# Patient Record
Sex: MALE | Race: WHITE
[De-identification: names, ages, dates, MRNs, and addresses within clinical notes are randomized per-mention and may not be internally consistent; named-entity substitution may affect disease eponyms.]

---

## 2019-07-24 ENCOUNTER — HOSPITAL ENCOUNTER (OUTPATIENT)
Dept: HOSPITAL 89 - AMB | Age: 59
End: 2019-07-24
Payer: COMMERCIAL

## 2019-07-24 ENCOUNTER — HOSPITAL ENCOUNTER (INPATIENT)
Dept: HOSPITAL 89 - ER | Age: 59
LOS: 6 days | Discharge: NURSING, REHAB OR LONG-TERM CARE | DRG: 66 | End: 2019-07-30
Attending: INTERNAL MEDICINE | Admitting: INTERNAL MEDICINE
Payer: COMMERCIAL

## 2019-07-24 ENCOUNTER — TRANSFERRED RECORDS (OUTPATIENT)
Dept: HEALTH INFORMATION MANAGEMENT | Facility: CLINIC | Age: 59
End: 2019-07-24

## 2019-07-24 VITALS — DIASTOLIC BLOOD PRESSURE: 82 MMHG | SYSTOLIC BLOOD PRESSURE: 135 MMHG

## 2019-07-24 VITALS — DIASTOLIC BLOOD PRESSURE: 88 MMHG | SYSTOLIC BLOOD PRESSURE: 120 MMHG

## 2019-07-24 VITALS — SYSTOLIC BLOOD PRESSURE: 115 MMHG | DIASTOLIC BLOOD PRESSURE: 81 MMHG

## 2019-07-24 VITALS — DIASTOLIC BLOOD PRESSURE: 84 MMHG | SYSTOLIC BLOOD PRESSURE: 139 MMHG

## 2019-07-24 VITALS — DIASTOLIC BLOOD PRESSURE: 82 MMHG | SYSTOLIC BLOOD PRESSURE: 129 MMHG

## 2019-07-24 VITALS — BODY MASS INDEX: 32.78 KG/M2 | HEIGHT: 70 IN | WEIGHT: 229 LBS

## 2019-07-24 VITALS — SYSTOLIC BLOOD PRESSURE: 156 MMHG | DIASTOLIC BLOOD PRESSURE: 82 MMHG

## 2019-07-24 DIAGNOSIS — E78.5: ICD-10-CM
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
## 2019-07-24 DIAGNOSIS — Z88.8: ICD-10-CM
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
## 2019-07-24 DIAGNOSIS — I10: ICD-10-CM

## 2019-07-24 DIAGNOSIS — I63.411: Primary | ICD-10-CM

## 2019-07-24 DIAGNOSIS — R29.705: ICD-10-CM

## 2019-07-24 DIAGNOSIS — R53.1: Primary | ICD-10-CM
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
## 2019-07-24 LAB
ALT SERPL-CCNC: 36 U/L (ref 0–56)
AST SERPL-CCNC: 30 U/L (ref 0–35)
CREAT SERPL-MCNC: 1 MG/DL (ref 0.66–1.25)
GFR SERPL CREATININE-BSD FRML MDRD: >60 ML/MIN/1.73M2
GLUCOSE SERPL-MCNC: 115 MG/DL (ref 75–110)
INR PPP: 0.95
INR PPP: 0.95
PLATELET COUNT, AUTOMATED: 279 K/UL (ref 150–450)
POTASSIUM SERPL-SCNC: 3.6 MMOL/L (ref 3.5–5)

## 2019-07-24 PROCEDURE — 70551 MRI BRAIN STEM W/O DYE: CPT

## 2019-07-24 PROCEDURE — 99291 CRITICAL CARE FIRST HOUR: CPT

## 2019-07-24 PROCEDURE — 97166 OT EVAL MOD COMPLEX 45 MIN: CPT

## 2019-07-24 PROCEDURE — 84484 ASSAY OF TROPONIN QUANT: CPT

## 2019-07-24 PROCEDURE — 84075 ASSAY ALKALINE PHOSPHATASE: CPT

## 2019-07-24 PROCEDURE — 93306 TTE W/DOPPLER COMPLETE: CPT

## 2019-07-24 PROCEDURE — 92523 SPEECH SOUND LANG COMPREHEN: CPT

## 2019-07-24 PROCEDURE — 82247 BILIRUBIN TOTAL: CPT

## 2019-07-24 PROCEDURE — 97162 PT EVAL MOD COMPLEX 30 MIN: CPT

## 2019-07-24 PROCEDURE — 82947 ASSAY GLUCOSE BLOOD QUANT: CPT

## 2019-07-24 PROCEDURE — 84155 ASSAY OF PROTEIN SERUM: CPT

## 2019-07-24 PROCEDURE — 71045 X-RAY EXAM CHEST 1 VIEW: CPT

## 2019-07-24 PROCEDURE — 36415 COLL VENOUS BLD VENIPUNCTURE: CPT

## 2019-07-24 PROCEDURE — 82040 ASSAY OF SERUM ALBUMIN: CPT

## 2019-07-24 PROCEDURE — 82435 ASSAY OF BLOOD CHLORIDE: CPT

## 2019-07-24 PROCEDURE — 84520 ASSAY OF UREA NITROGEN: CPT

## 2019-07-24 PROCEDURE — 85025 COMPLETE CBC W/AUTO DIFF WBC: CPT

## 2019-07-24 PROCEDURE — 84478 ASSAY OF TRIGLYCERIDES: CPT

## 2019-07-24 PROCEDURE — 99285 EMERGENCY DEPT VISIT HI MDM: CPT

## 2019-07-24 PROCEDURE — 84295 ASSAY OF SERUM SODIUM: CPT

## 2019-07-24 PROCEDURE — 70498 CT ANGIOGRAPHY NECK: CPT

## 2019-07-24 PROCEDURE — 82565 ASSAY OF CREATININE: CPT

## 2019-07-24 PROCEDURE — 70450 CT HEAD/BRAIN W/O DYE: CPT

## 2019-07-24 PROCEDURE — 82310 ASSAY OF CALCIUM: CPT

## 2019-07-24 PROCEDURE — 84460 ALANINE AMINO (ALT) (SGPT): CPT

## 2019-07-24 PROCEDURE — 99292 CRITICAL CARE ADDL 30 MIN: CPT

## 2019-07-24 PROCEDURE — 85730 THROMBOPLASTIN TIME PARTIAL: CPT

## 2019-07-24 PROCEDURE — 84450 TRANSFERASE (AST) (SGOT): CPT

## 2019-07-24 PROCEDURE — 70496 CT ANGIOGRAPHY HEAD: CPT

## 2019-07-24 PROCEDURE — 83718 ASSAY OF LIPOPROTEIN: CPT

## 2019-07-24 PROCEDURE — 93005 ELECTROCARDIOGRAM TRACING: CPT

## 2019-07-24 PROCEDURE — 84132 ASSAY OF SERUM POTASSIUM: CPT

## 2019-07-24 PROCEDURE — 82374 ASSAY BLOOD CARBON DIOXIDE: CPT

## 2019-07-24 PROCEDURE — 85610 PROTHROMBIN TIME: CPT

## 2019-07-24 PROCEDURE — 82465 ASSAY BLD/SERUM CHOLESTEROL: CPT

## 2019-07-24 RX ADMIN — ENOXAPARIN SODIUM SCH MG: 100 INJECTION SUBCUTANEOUS at 10:17

## 2019-07-24 RX ADMIN — SIMVASTATIN SCH MG: 20 TABLET, FILM COATED ORAL at 21:22

## 2019-07-24 NOTE — RADIOLOGY IMAGING REPORT
FACILITY: [facility] 

 

PATIENT NAME: Jan Croft

: 1960

MR: [id number]

V: [id number]

EXAM DATE: 

ORDERING PHYSICIAN: DWIGHT HEBERT

TECHNOLOGIST: 

 

Location: [facility]

Patient: Jan Croft

: 1960

MRN: [id number]

Visit/Account:[id number]

Date of Sevice:  2019

 

ACCESSION #: [id number]

 

CT angiogram head and neck

 

INDICATION: Stroke alert.

 

COMPARISON:  Same-day head CT.

 

TECHNIQUE:  Axial CT images were obtained through the cerebral and neck vasculature.  Multiplanar ref
ormatted images were provided.  2-D and 3-D imaging was performed.  Evaluation of internal carotid st
enosis was performed per NASCET criteria.  A total of 75 mL Isovue-370 IV contrast was administered. 
  One of the following dose optimization techniques was utilized in the performance of this exam: Aut
omated exposure control; adjustment of the mA and/or kV according to the patient's size; or use of an
 iterative  reconstruction technique.  Specific details can be referenced in the facility's radiology
 CT exam operational policy.

 

 

FINDINGS:

The middle cerebral arteries, anterior cerebral arteries, posterior cerebral arteries as well as the 
bilateral vertebral arteries and carotid arteries are patent without focal stricture or aneurysm.  Mi
ld atherosclerosis at the right carotid bulb and at the bilateral internal carotid arteries with no s
ignificant stenosis.  The visualized aortic arch and great vessels off the aortic arch are unremarkab
le.

 

Dural venous sinuses are patent.

 

No acute osseous abnormality.  Moderate spondylosis at C5-6 with associated spinal canal and neural f
oraminal narrowing.  Soft tissues are nonacute.  1 cm slightly hyperattenuating nodule in the isthmus
 of the thyroid.  Lung apices are clear.  Azygos lobe.  Moderate coronary artery calcifications.

 

IMPRESSION:

1.  Mild atherosclerosis with no acute abnormality of the neck and cerebral vasculature.

 

2.  No apparent acute intracranial abnormality.

 

3.  Moderate coronary artery calcifications.

 

4.  1 cm thyroid nodule.

 

Dr. Hillman discussed this case with DWIGHT HEBERT on 2019 5:14 AM.

 

----------

MANAGING INCIDENTAL THYROID NODULES DETECTED ON CT OR MRI

 

*These do not apply to all patients, such as those with clinical risk factors for thyroid cancer or p
ediatric patients.

 

Suspicious CT or MRI findings (abnormal LNs or invasion of adjacent tissues)

Evaluate with thyroid US

 

NO Suspicious CT or MRI findings

Limited life expectancy or comorbidities

*  No further evaluation

 

General population

< 35 years old

*  < 1 cm ? No further evaluation

*  >/= 1 cm ? Evaluate with thyroid US

 

> 35 years old

*  < 1.5 cm ? No further evaluation

*  >/= 1.5 cm ? Evaluate with thyroid US

 

 

Teresa Chaparro et al.  Managing Incidental Thyroid Nodules Detected on Imaging: White Paper of the FANNIE KEYES Incidental Thyroid Findings Committee.  Journal of the American College of Radiology 2017.

 

uchnitn

 

Report Dictated By: Jona Hillman MD at 2019 5:02 AM

 

Report E-Signed By: Jona Hillman MD  at 2019 5:14 AM

 

WSN:[id number]

## 2019-07-24 NOTE — NUR
ST IMPRESSION

Cognitive linguistic and bedside swallow assessments complete. See

full report for detailed information. Mild cognitive deficits observed, 

particularly in the areas of attention and executive functions (consistent

with R frontal CVA). From a swallow standpoint, the pt is considered safe 

for continuance of a regular diet, thin liquids with adherence to general 

precautions. Pt may continue to experience difficulty with self-feeding as 

a result of L inattention and deficits in executive functioning skills (reduced 

self-monitoring, impulsiveness). Encourage staff to support as indicated. 

Recommend D/C to acute rehab.

## 2019-07-24 NOTE — HISTORY & PHYSICAL
History of Present Illness


Chief Complaint


L sided numbness, weakness


History of Present Illness


59M presented with L sided weakness and numbness. PMHx significant for HTN, HLD.


EMS called by patients wife after he fell out of bed around 4am and had 


difficulty standing up. Last known well 10pm 07.23 when they went to sleep in 


hotel. [place]. NIH 5 


in ER with Tele-stroke neurologist. At my time of evaluation NIH 4. MRI does 


show area of ischemia, no evidence of bleeding. Due to ASA allergy patient was 


started on clopidogrel.





History


Home Meds


Reported Medications


Simvastatin (SIMVASTATIN) 20 Mg Tablet, 20 MG PO HS, TAB


   7/24/19


Losartan Potassium (LOSARTAN POTASSIUM) 25 Mg Tablet, 25 MG PO QDAY


   7/24/19


Allergies:  


Coded Allergies:  


     aspirin (Verified  Allergy, Unknown, 7/24/19)


Patient History:  


FH: CAD (coronary artery disease)


Hx Smoking:  No





Review of Systems


All Systems Reviewed/Normal:  Yes, Except as Noted


Neurological:  Weakness, Other (L sided numbness)





Exam


Vital Signs





Vital Signs








  Date Time  Temp Pulse Resp B/P (MAP) Pulse Ox O2 Delivery O2 Flow Rate FiO2


 


7/24/19 07:30  73 18 121/75 (90)    


 


7/24/19 05:11     97   


 


7/24/19 03:37 99.1     Room Air  








General Appearance:  Alert, Awake, No Acute Distress, Afebrile


Neuro:  Other (L sided paresthesia, L arm weakness 3/5, L facial droop/paresthes


ia, LLE 5/5 strength, CN 2-12 otherwise intact)


Cardiovascular:  Normal Rhythm & Peripheral Pulses


Respiratory:  No Respiratory Distress


GI:  Abd Soft and Non-Tender


Extremities:  Soft and Non Tender, Warm, Pulses, Perfused


Integumentary:  Skin Intact without Lesion / Mass





Medical Decision Making


Data Points


Result Diagram:  


7/24/19 0329 7/24/19 0329








EKG / Imaging


EKG Interpretation


NSR


Monitor Interpretation:  Normal Sinus Rhythm





Assessment and Plan


Problems:  


(1) CVA (cerebrovascular accident)


Status:  Acute


Assessment & Plan:  NIH 5 in ER, NIH 4 on admission, MRI - Diffusion restriction


involving the perisylvian right frontal lobe. CTA with normal vacular imaging, 


no evidence of hemorrhage. Patient presented outside TPA window, begin Plavix 


due to patient allergy to ASA. Resume simvastatin. PT/OT/ST consulted. Monitor 


on telemetry. Echo with bubble study pending. 





(2) HTN (hypertension)


Status:  Chronic


Assessment & Plan:  Will hold losartan, allow permissive HTN. Only intervene if 


BP increases > 220 systolic over next 24-48 hours.








Venous Thromboembolism


Antithrombotics


Is Pt On Any Antithrombotics?:  Yes





CVA Documentation


Date of Onset of Symptoms:  Jul 24, 2019


Symptom Onset Unknown:  Yes


Signs and Symptoms of Stroke:  Dyspraxia of Arm


NIH Stroke Scale:  4


Date of NIH Scale:  Jul 24, 2019


Time of NIH Scale:  06:15


Baseline Date:  Jul 23, 2019


Baseline Time:  22:00


Heparin Therapy Protocol:  N/A





Exam


Sepsis Risk:  No Definite Risk





Problem Qualifiers





(1) CVA (cerebrovascular accident):  


CVA mechanism:  embolism  Precerebral and cerebral artery:  middle cerebral 


artery  Laterality of affected vessel:  right  Qualified Codes:  I63.411 - 


Cerebral infarction due to embolism of right middle cerebral artery








BRUCE CRAWFORD DO       Jul 24, 2019 08:25

## 2019-07-24 NOTE — NUR
Physical Therapy Impression



PT eval complete.  Noted grossly symmetrical strength 5/5 for B LE

dorsiflexion, knee flexion/extension, and hip flexion.  Noted L UE and LE

ataxia with movement.  Decreased sensation reported in L UE and LE with

light touch.  Pt able to perform bed mobility and sit<>stand stransfers

with SBA/CGA.  Attempted to ambulate with kimberly-walker in R UE,

Pt unable to safely use.  This PT provided CGA, then hand-hold assist,

then Min A for balance as Pt ambulated and became more fatigued.  Noted

moderate L neglect.  Strongly recommend acute rehab.





Physical Therapy Goals



1.  Independent bed mobility.

2.  Independent transfers.

3.  Mod I ambulation x 150' with least restrictive device.

4.  Ascend/descend 4 stairs SBA.



Patient's Goals

## 2019-07-24 NOTE — RADIOLOGY IMAGING REPORT
FACILITY: [facility] - [place], [place] 

 

PATIENT NAME: Jan Croft

: 1960

MR: [id number]

V: [id number]

EXAM DATE: 

ORDERING PHYSICIAN: DWIGHT HEBERT

TECHNOLOGIST: 

 

Location: [facility]

Patient: Jan Croft

: 1960

MRN: [id number]

Visit/Account:[id number]

Date of Sevice:  2019

 

ACCESSION #: [id number]

 

CT angiogram head and neck

 

INDICATION: Stroke alert.

 

COMPARISON:  Same-day head CT.

 

TECHNIQUE:  Axial CT images were obtained through the cerebral and neck vasculature.  Multiplanar ref
ormatted images were provided.  2-D and 3-D imaging was performed.  Evaluation of internal carotid st
enosis was performed per NASCET criteria.  A total of 75 mL Isovue-370 IV contrast was administered. 
  One of the following dose optimization techniques was utilized in the performance of this exam: Aut
omated exposure control; adjustment of the mA and/or kV according to the patient's size; or use of an
 iterative  reconstruction technique.  Specific details can be referenced in the facility's radiology
 CT exam operational policy.

 

 

FINDINGS:

The middle cerebral arteries, anterior cerebral arteries, posterior cerebral arteries as well as the 
bilateral vertebral arteries and carotid arteries are patent without focal stricture or aneurysm.  Mi
ld atherosclerosis at the right carotid bulb and at the bilateral internal carotid arteries with no s
ignificant stenosis.  The visualized aortic arch and great vessels off the aortic arch are unremarkab
le.

 

Dural venous sinuses are patent.

 

No acute osseous abnormality.  Moderate spondylosis at C5-6 with associated spinal canal and neural f
oraminal narrowing.  Soft tissues are nonacute.  1 cm slightly hyperattenuating nodule in the isthmus
 of the thyroid.  Lung apices are clear.  Azygos lobe.  Moderate coronary artery calcifications.

 

IMPRESSION:

1.  Mild atherosclerosis with no acute abnormality of the neck and cerebral vasculature.

 

2.  No apparent acute intracranial abnormality.

 

3.  Moderate coronary artery calcifications.

 

4.  1 cm thyroid nodule.

 

Dr. Hillman discussed this case with DWIGHT HEBERT on 2019 5:14 AM.

 

----------

MANAGING INCIDENTAL THYROID NODULES DETECTED ON CT OR MRI

 

*These do not apply to all patients, such as those with clinical risk factors for thyroid cancer or p
ediatric patients.

 

Suspicious CT or MRI findings (abnormal LNs or invasion of adjacent tissues)

Evaluate with thyroid US

 

NO Suspicious CT or MRI findings

Limited life expectancy or comorbidities

*  No further evaluation

 

General population

< 35 years old

*  < 1 cm ? No further evaluation

*  >/= 1 cm ? Evaluate with thyroid US

 

> 35 years old

*  < 1.5 cm ? No further evaluation

*  >/= 1.5 cm ? Evaluate with thyroid US

 

 

Teresa Chaparro et al.  Managing Incidental Thyroid Nodules Detected on Imaging: White Paper of the FANNIE KEYES Incidental Thyroid Findings Committee.  Journal of the American College of Radiology 2017.

 

uchnitn

 

Report Dictated By: Jona Hillman MD at 2019 5:02 AM

 

Report E-Signed By: Jona Hillman MD  at 2019 5:14 AM

 

WSN:[id number]

## 2019-07-24 NOTE — RADIOLOGY IMAGING REPORT
FACILITY: [facility] 

 

PATIENT NAME: Jan Croft

: 1960

MR: [id number]

V: [id number]

EXAM DATE: 

ORDERING PHYSICIAN: DWIGHT HEBERT

TECHNOLOGIST: 

 

Location: [facility] [place]

Patient: Jan Croft

: 1960

MRN: [id number]

Visit/Account:[id number]

Date of Sevice:  2019

 

ACCESSION #: [id number]

 

 

AP CHEST 2019 3:55 AM.

 

INDICATION: Weakness, fall.

 

COMPARISON: None.

 

FINDINGS:

 

 

Lungs are well-expanded. There is no consolidation. No pleural effusion or pneumothorax.  Azygos lobe
.  Heart size is normal.

 

IMPRESSION: No acute abnormality.

 

Report Dictated By: Jona Hillman MD at 2019 4:33 AM

 

Report E-Signed By: Jona Hillman MD  at 2019 4:34 AM

 

WSN:[id number]

## 2019-07-24 NOTE — RADIOLOGY IMAGING REPORT
FACILITY: [facility] [place] 

 

PATIENT NAME: Jan Croft

: 1960

MR: [id number]

V: [id number]

EXAM DATE: 

ORDERING PHYSICIAN: DWIGHT HEBERT

TECHNOLOGIST: 

 

Location: [facility]

Patient: Jan Croft

: 1960

MRN: [id number]

Visit/Account:[id number]

Date of Sevice:  2019

 

ACCESSION #: [id number]

 

Study: CT scan of the brain without intravenous contrast.

 

Indication: Stroke symptoms

 

Comparison study:None

 

Technique: Multiple axial images were obtained through the brain without the use of intravenous contr
ast.

 

One of the following dose optimization techniques was utilized in the performance of this exam: Autom
ated exposure control; adjustment of the mA and/or kV according to the patient's size; or use of an i
terative  reconstruction technique.  Specific details can be referenced in the facility's radiology C
T exam operational policy.

 

 

The examination demonstrates no evidence of acute intracranial hemorrhage. There is no evidence of ex
tra-axial collection or hydrocephalus.

 

There is no abnormal density identified within the brain parenchyma. Within the left basal ganglion, 
there is a 1.5 cm ovoid area of low density. This likely represents a perivascular space.

 

There is no evidence of disruption of the peripheral gray-white junction.

 

The bony structures are unremarkable.

 

IMPRESSION: No acute intracranial abnormality identified.

 

Report Dictated By: Valdemar Ferrell at 2019 4:01 AM

 

Report E-Signed By: Valdemar Ferrell  at 2019 4:07 AM

 

WSN:[id number]

## 2019-07-24 NOTE — RADIOLOGY IMAGING REPORT
FACILITY: [facility] [place] 

 

PATIENT NAME: Jan Croft

: 1960

MR: [id number]

V: [id number]

EXAM DATE: 

ORDERING PHYSICIAN: DWIGHT FORDE

TECHNOLOGIST: 

 

Location: [facility]

Patient: Jan Croft

: 1960

MRN: [id number]

Visit/Account:[id number]

Date of Sevice:  2019

 

ACCESSION #: [id number]

 

Study: MRI brain without the use of intravenous contrast

 

Indication: Stroke symptoms

 

Comparison study: None

 

Technique: Multiplanar MRI sequences were obtained through the brain without the use of intravenous g
adolinium contrast.

 

The examination demonstrates no evidence of acute intracranial hemorrhage. There is no evidence of ex
tra-axial collection or hydrocephalus.

 

A diffusion-weighted sequence was performed and demonstrates the presence of diffusion restriction in
volving the perisylvian right frontal lobe.

 

There is minimal increased T2-weighted signal on the FLAIR sequence corresponding to the area of infa
rction. There is no evidence of hemorrhage associated with the area of infarction.

 

There is an area of CSF signal intensity within the left basal ganglion. This is most consistent with
 a enlarged perivascular space.

 

The orbits are grossly unremarkable.

 

There is no significant abnormality of the paranasal sinuses present.

 

IMPRESSION: Diffusion restriction involving the perisylvian right frontal lobe. There is no hemorrhag
e associated with the area of infarction.

 

Dr. Forde was made aware of these findings at 5:56 AM.

 

Report Dictated By: Valdemar Ferrell at 2019 5:54 AM

 

Report E-Signed By: Valdemar Ferrell  at 2019 5:58 AM

 

WSN:[id number]

## 2019-07-24 NOTE — MEDICAL NUTRITION THERAPY
Nutrition Anthropometrics


Height (Inches):  70.00


Height (Calculated Centimeters:  177.080321


Weight (Pounds):  229


Weight (Calculated Kilograms):  103.901


BMI:  32.9


Hx Weight Loss:  Yes (Intentional Wt loss (Lost ~5lbs in last 6 months))


Vick Nutrition Score:          


Vick Nutrition Risk Score:


Dietary Referral


Nutrition Risk Factors:      


Nutrition Risk Comment:





Physical Findings


Physical Appearance:  Obese BMI 30-39


Skin Appearance


Skin Appearance:


Edema


Edema Location Modifier:  


Edema Location:               


Type of Edema:                 


Degree of Edema:


Gastrointestinal Symptoms


GI Symtoms:                 


Tube Present:               


Bowel Sounds:              


Recent Bowel Pattern:    


Stool Characteristics:





Nutrition/Food History


Good


Breakfast:  Typically eats 3 meals per day





Nutritional Diagnosis


Nutritional Risk Acuity 3:  Fair Appetite, Eat/Chew Problem


Nutritional Risk Acuity 4:  Good Appetite


Past Medical History:  


HTN, HLD


Nutritional Acuity:  3-Mild


Adjusted Energy Requirement Re:  2147 (Li Mcbh [place] Equation)


Protein Requirement:  104


Fluid Requirement:  2147 (1mL/kcal)


Diet Type:  NPO (Nothing by Mouth)





Nutrition Monitoring & Eval


RD Patient Assessment Time:  60 minutes


RD Assessment Type:  RD Assessment


Patient Nutrition Acuity:  3-Mild


Follow Up Date:  Jul 29, 2019


Nutritional Comment:  


7/24/19-Spoke with pt and his wife this am. Pt admit for CVA. Significant


PMH includes HTN, HLD. Pt appears well nourished, reports eating 3 meals


per day. Usual body weight is ~230 lbs. Pt reports intentional wt loss of


~5lbs over last 6 months. Pt reported some facial numbing. Pt wife asked


when pt could water or food. Spoke with RN and plan is to complete a SLP


evaluation before diet advancement. Communicated plan to wife and pt. Also


provided handout on stroke nutrition therapy focusing on low


sodium/cholesterol. Will continue to monitor diet advancement, po intake,


and any questions on stroke nutrition education.ELENI CHAPA                Jul 24, 2019 10:26

## 2019-07-24 NOTE — ER REPORT
History and Physical


Time Seen By MD:  03:33


Hx. of Stated Complaint:  


PATIENT IS FROM [facility]. TRAVELING THROUGH, WAS AT A HOTEL AND FELL OUT OF BED 


AROUND 0300; PATIENTS WIFE THOUGHT HE WAS DISORIENTED AND HAD LEFT SIDED 


WEEKNESS; PATIENT STATES THAT HE "FEELS FINE" EMS NOTICED THAT THE PATIENT DID 


HAVE LEFT SIDED WEEKNESS AS WELL AND LEFT ARM DRIFT WITH STROKE SCALE; NEG. TO 


FACIAL DROOP AND OR SLURRED SPEACH


HPI/ROS


CHIEF COMPLAINT: left sided weakness, confusion with fall out of bed





HISTORY OF PRESENT ILLNESS: This is a 59 year old male. He is traveling through,


moving from [place] to [place]. He fell out of bed and his wife noted 


confusion and left sided weakness and slurred speech. Called EMS who arrived. On


EMS evaluation, they noted drift of the left arm, but did not appreciate any 


confusion or slurred speech. Otherwise negative. On arrival, the patient seems 


better, without deficits. The patient was able to tell me what happened this 


morning and he was feeling fine. He denies any headache, chest pain, shortness 


of breath. No weakness at this time and no dizziness. He has normal vision. No r


ecent illnesses, and no fevers or chills. He takes a cholesterol and blood 


pressure medicine, but denies any heart problems otherwise. Family history of 


heart disease and stroke in father and brother. He is allergic to aspirin, 


anaphylaxis/hives.





REVIEW OF SYSTEMS: Otherwise negative. No trouble with bowel or bladder. No 


musculoskeletal pain.


Allergies:  


Coded Allergies:  


     aspirin (Verified  Allergy, Unknown, 7/24/19)


Home Meds


Reported Medications


Simvastatin (SIMVASTATIN) 20 Mg Tablet, 20 MG PO HS, TAB


   7/24/19


Losartan Potassium (LOSARTAN POTASSIUM) 25 Mg Tablet, 25 MG PO QDAY


   7/24/19


Reviewed Nurses Notes:  Yes


Constitutional





Vital Sign - Last 24 Hours








 7/24/19 7/24/19 7/24/19 7/24/19





 03:36 03:37 03:38 03:41


 


Temp  99.1  


 


Pulse 62 59  62


 


Resp 44 18  11


 


B/P (MAP)  122/80 123/77 (92) 


 


Pulse Ox 93 96  90


 


O2 Delivery  Room Air  


 


    





 7/24/19 7/24/19 7/24/19 7/24/19





 03:46 03:51 03:56 04:00


 


Pulse ??? ??? 62 


 


Resp   12 


 


B/P (MAP)    133/80 (97)


 


Pulse Ox   100 





 7/24/19 7/24/19 7/24/19 7/24/19





 04:01 04:06 04:11 04:16


 


Pulse 66 62 63 71


 


Resp 19 37  29


 


Pulse Ox 93 96  





 7/24/19 7/24/19 7/24/19 7/24/19





 04:21 04:26 04:30 04:31


 


Pulse 68 66  63


 


Resp  17  28


 


B/P (MAP)   140/86 (104) 


 


Pulse Ox  89  91





 7/24/19 7/24/19 7/24/19 7/24/19





 04:36 04:41 04:44 04:46


 


Pulse ??? ???  ???


 


Resp    16


 


B/P (MAP)   134/76 (95) 


 


Pulse Ox    82





 7/24/19 7/24/19 7/24/19 7/24/19





 04:51 04:56 05:00 05:01


 


Pulse 72 65  65


 


Resp    22


 


B/P (MAP)   146/85 (105) 


 


Pulse Ox 89   99





 7/24/19 7/24/19  





 05:06 05:11  


 


Pulse 65 62  


 


Resp 15 15  


 


Pulse Ox 87 97  








Physical Exam


   General Appearance: The patient is alert. No immediate need for airway 


protection. No acute distress. Non-toxic in appearance.


Eyes: Pupils are equal, round. Reactive to light. No pallor, injection or icter


us. Extraocular movements are intact. Normal visual fields by direct 


confrontation. No nystagmus.


ENT: Mucous membranes are moist. Normal oral mucosa. Posterior oropharynx is 


normal. Normal tympanic membranes and canals.


Neck: Supple and non tender. No lymphadenopathy.


Respiratory: Lungs are clear to auscultation. There are no retractions or 


accessory muscle use.


Cardiovascular: Regular rate and rhythm. No murmurs, gallops or rubs. Normal 


capillary refill. No edema. No carotid bruits.


Gastrointestinal:  Abdomen is soft and non tender. Nondistended. Normal active 


bowel sounds.


Neurological: Alert and oriented x3. Cranial nerved exam with eye exam as noted 


above. Midline tongue, symmetric palate elevation, no facial weakness noted, no 


facial numbness to touch. Extremity exam shows equal strength in upper 


extremities, left leg seems a slight bit weaker. No ataxia on finger to nose or 


heel to shin. No dysphagia or aphasia noted.


Skin: Warm and dry. No rashes.


Musculoskeletal: Extremities are nontender. Full range of motion. No tenderness 


in palpation of the cervical, thoracic and lumbar spine.





DIFFERENTIAL DIAGNOSIS: After history and physical exam, differential diagnosis 


was considered for patient with fall and deficits concerning for stroke.





NIH stroke scale performed. The patient scored a 3. 2 points for loss of sharp 


sensation to pin-prick on the entire left side and 1 point for slight drift of 


the left foot.





Telestroke was done with Dr. Calderón, neurology from the [facility] 


also evaluated. On her assessment, the left sided weakness was returning again 


as well as left facial droop. She scored a 5 on NIH stroke scale.





Time of Last Known Normal would have been 2200 hours last night, 6+ hours.





Medical Decision Making


Data Points


Result Diagram:  


7/24/19 0329 7/24/19 0329





Laboratory





Hematology








Test


 7/24/19


03:29


 


White Blood Count


 7.0 k/uL


(4.5-11.0)


 


Red Blood Count


 5.22 M/uL


(4.00-5.60)


 


Hemoglobin


 14.2 g/dL


(14.0-18.0)


 


Hematocrit


 41.5 %


(42.0-52.0)  L


 


Mean Corpuscular Volume


 79.6 fL


(80.0-96.0)  L


 


Mean Corpuscular Hemoglobin


 27.2 pg


(26.0-33.0)


 


Mean Corpuscular Hemoglobin


Concent 34.2 g/dL


(32.0-36.0)


 


Red Cell Distribution Width


 13.9 %


(11.5-14.5)


 


Platelet Count


 279 K/uL


(150-450)


 


Mean Platelet Volume


 7.8 fL


(7.2-11.1)


 


Neutrophils (%) (Auto)


 50.7 %


(39.4-72.5)


 


Lymphocytes (%) (Auto)


 34.3 %


(17.6-49.6)


 


Monocytes (%) (Auto)


 12.0 %


(4.1-12.4)


 


Eosinophils (%) (Auto)


 2.2 %


(0.4-6.7)


 


Basophils (%) (Auto)


 0.8 %


(0.3-1.4)


 


Nucleated RBC Relative Count


(auto) 0.1 /100WBC  





 


Neutrophils # (Auto)


 3.5 K/uL


(2.0-7.4)


 


Lymphocytes # (Auto)


 2.4 K/uL


(1.3-3.6)


 


Monocytes # (Auto)


 0.8 K/uL


(0.3-1.0)


 


Eosinophils # (Auto)


 0.2 K/uL


(0.0-0.5)


 


Basophils # (Auto)


 0.1 K/uL


(0.0-0.1)


 


Nucleated RBC Absolute Count


(auto) 0.00 K/uL  











Chemistry








Test


 7/24/19


03:29


 


Sodium Level


 137 mmol/L


(137-145)


 


Potassium Level


 3.6 mmol/L


(3.5-5.0)


 


Chloride Level


 97 mmol/L


()


 


Carbon Dioxide Level


 28 mmol/L


(22-30)


 


Blood Urea Nitrogen


 20 mg/dl


(9-21)


 


Creatinine


 1.00 mg/dl


(0.66-1.25)


 


Glomerular Filtration Rate


Calc > 60.0 





 


Random Glucose


 115 mg/dl


()


 


Calcium Level


 9.5 mg/dl


(8.4-10.2)


 


Total Bilirubin


 0.5 mg/dl


(0.2-1.3)


 


Aspartate Amino Transf


(AST/SGOT) 30 U/L (0-35) 





 


Alanine Aminotransferase


(ALT/SGPT) 36 U/L (0-56) 





 


Alkaline Phosphatase 42 U/L (0-126) 


 


Troponin I < 0.012 ng/ml 


 


Total Protein


 7.7 g/dl


(6.3-8.2)


 


Albumin


 4.5 g/dl


(3.5-5.0)








Coagulation








Test


 7/24/19


03:29


 


Prothrombin Time


 12.7 seconds


(12.0-14.4)


 


Prothromb Time International


Ratio 0.95 





 


Activated Partial


Thromboplast Time 28 seconds


(23-35)











EKG/Imaging


EKG Interpretation


12 lead EKG:


      Rhythm: Normal sinus rhythm, rate 60 to


      Axis: normal 


      QRS: normal


      ST segments: No elevation or depression noted. Nonspecific flattening


Imaging


Study: CT scan of the brain without intravenous contrast.


 


Indication: Stroke symptoms


 


Comparison study:None


 


Technique: Multiple axial images were obtained through the brain without the use


of intravenous contrast.


 


One of the following dose optimization techniques was utilized in the 


performance of this exam: Automated exposure control; adjustment of the mA 


and/or kV according to the patient's size; or use of an iterative  


reconstruction technique.  Specific details can be referenced in the facility's 


radiology CT exam operational policy.


 


The examination demonstrates no evidence of acute intracranial hemorrhage. There


is no evidence of extra-axial collection or hydrocephalus.


 


There is no abnormal density identified within the brain parenchyma. Within the 


left basal ganglion, there is a 1.5 cm ovoid area of low density. This likely 


represents a perivascular space.


 


There is no evidence of disruption of the peripheral gray-white junction.


 


The bony structures are unremarkable.


 


IMPRESSION: No acute intracranial abnormality identified.


 


Report Dictated By: Valdemar Ferrell at 7/24/2019 4:01 AM








AP CHEST 7/24/2019 3:55 AM.


 


INDICATION: Weakness, fall.


 


COMPARISON: None.


 


FINDINGS:


 


Lungs are well-expanded. There is no consolidation. No pleural effusion or 


pneumothorax.  Azygos lobe.  Heart size is normal.


 


IMPRESSION: No acute abnormality.


 


Report Dictated By: Jona Hillman MD at 7/24/2019 4:33 AM


 





CT angiogram head and neck


 


INDICATION: Stroke alert.


 


COMPARISON:  Same-day head CT.


 


TECHNIQUE:  Axial CT images were obtained through the cerebral and neck 


vasculature.  Multiplanar reformatted images were provided.  2-D and 3-D imaging


was performed.  Evaluation of internal carotid stenosis was performed per NASCET


criteria.  A total of 75 mL Isovue-370 IV contrast was administered.   One of 


the following dose optimization techniques was utilized in the performance of th


is exam: Automated exposure control; adjustment of the mA and/or kV according to


the patient's size; or use of an iterative  reconstruction technique.  Specific 


details can be referenced in the facility's radiology CT exam operational 


policy.


 


 


FINDINGS:


The middle cerebral arteries, anterior cerebral arteries, posterior cerebral 


arteries as well as the bilateral vertebral arteries and carotid arteries are 


patent without focal stricture or aneurysm.  Mild atherosclerosis at the right 


carotid bulb and at the bilateral internal carotid arteries with no significant 


stenosis.  The visualized aortic arch and great vessels off the aortic arch are 


unremarkable.


 


Dural venous sinuses are patent.


 


No acute osseous abnormality.  Moderate spondylosis at C5-6 with associated 


spinal canal and neural foraminal narrowing.  Soft tissues are nonacute.  1 cm 


slightly hyperattenuating nodule in the isthmus of the thyroid.  Lung apices are


clear.  Azygos lobe.  Moderate coronary artery calcifications.


 


IMPRESSION:


1.  Mild atherosclerosis with no acute abnormality of the neck and cerebral 


vasculature.


 


2.  No apparent acute intracranial abnormality.


 


3.  Moderate coronary artery calcifications.


 


4.  1 cm thyroid nodule.


 


Dr. Hillman discussed this case with DWIGHT FORDE on 7/24/2019 5:14 AM.


 


Report Dictated By: Jona Hillman MD at 7/24/2019 5:02 AM








Study: MRI brain without the use of intravenous contrast


 


Indication: Stroke symptoms


 


Comparison study: None


 


Technique: Multiplanar MRI sequences were obtained through the brain without the


use of intravenous gadolinium contrast.


 


The examination demonstrates no evidence of acute intracranial hemorrhage. There


is no evidence of extra-axial collection or hydrocephalus.


 


A diffusion-weighted sequence was performed and demonstrates the presence of 


diffusion restriction involving the perisylvian right frontal lobe.


 


There is minimal increased T2-weighted signal on the FLAIR sequence 


corresponding to the area of infarction. There is no evidence of hemorrhage 


associated with the area of infarction.


 


There is an area of CSF signal intensity within the left basal ganglion. This is


most consistent with a enlarged perivascular space.


 


The orbits are grossly unremarkable.


 


There is no significant abnormality of the paranasal sinuses present.


 


IMPRESSION: Diffusion restriction involving the perisylvian right frontal lobe. 


There is no hemorrhage associated with the area of infarction.


 


Dr. Forde was made aware of these findings at 5:56 AM.


 


Report Dictated By: Valdemar Ferrell at 7/24/2019 5:54 AM





ED Course/Re-evaluation


Clinical Indication for ER IV:  Hydration, IV Access


ED Course


CT scan was done, negative as noted. EKG and chest x-ray obtained. Labs also o


btained showing a white count of 7.0, H&H of 14.2 and 41.5, and platelets of 


279. Metabolic panel has normal electrolytes other than a slightly low chloride 


at 97, BUN to creatinine ratio was slightly elevated at 20 and 1.0. Normal liver


function testing. Glucose is 1:15. Troponin is undetectable. Coagulation studies


are normal.





After tele-stroke was done, it was felt that the patient has signs that would 


indicate acute stroke, however he is outside of any window for thrombolytics, 


risks to benefit ratio would be too high of a risk at this time. Recommendation 


for further imaging with CT angiogram and then MRI. Other interventions will be 


determined on the basis of these studies. The patient is allergic to aspirin, so


we will pursue Plavix once the CT angiograms are done and if they are negative.





CT angiograms are negative for any large vessel occlusions. Plavix 75 mg oral 


dose was added. MRI brain noncontrast being done.





MRI shows embolic stroke right side consistent with left sided symptoms. 


Discussed with patient and his wife. Dr. Caldwell came to the ER to see 


him and has accepted him for admission to the hospital.


Decision to Disposition Date:  Jul 24, 2019


Decision to Disposition Time:  06:16


Critical Care Time


I spent a total of 90 minutes of critical care time in obtaining history, 


performing a physical exam, bedside monitoring of interventions, collecting and 


interpreting tests and discussion with consultants but not including time spent 


performing procedures.





Depart


Departure


Latest Vital Signs





Vital Signs








  Date Time  Temp Pulse Resp B/P (MAP) Pulse Ox O2 Delivery O2 Flow Rate FiO2


 


7/24/19 05:11  62 15  97   


 


7/24/19 05:00    146/85 (105)    


 


7/24/19 03:37 99.1     Room Air  








Impression:  


   Primary Impression:  


   CVA (cerebrovascular accident)


Condition:  Condition Unchanged


Disposition:  Admitted from ER





Problem Qualifiers








   Primary Impression:  


   CVA (cerebrovascular accident)


   CVA mechanism:  embolism  Precerebral and cerebral artery:  middle cerebral 


   artery  Laterality of affected vessel:  right  Qualified Codes:  I63.411 - 


   Cerebral infarction due to embolism of right middle cerebral artery








DWIGHT FORDE MD             Jul 24, 2019 03:54

## 2019-07-24 NOTE — SPEECH INITIAL EVALUATION
INITIAL SPEECH THERAPY EVALUATION REPORT

Cognitive Linguistic and Bedside Swallow Assessment

Patient Name:  Jan Croft

Date of Evaluation: 19

Patient : 1960

Clinician:  Effie Villaseñor M.S., CCC-SLP    

Treatment Dx:  



BACKGROUND

The patient is a 59-year-old male admitted to [facility] on 19 with L sided 

weakness and numbness. He fell out of bed around 4am, and had difficulty 

standing back up. He and his [place].  Brain MRI revealed diffusion 

restriction involving the perisylvian right frontal lobe.  The pt was referred 

for an ST evaluation to analyze cognitive linguistic status and swallow function

s/p acute CVA with associated L inattention. 



Primary Medical Diagnosis: R frontal CVA

Past Medical Hx: HTN

Pain Scale (0-10): 0

LOC / Participation:  very pleasant, participatory, somewhat tearful

Prior Level of Function:  independent with all functions, relocating for new job

in [place]



DYSPHAGIA ASSESSMENT

Sialorrhea: Not directly observed; however, RN reports some drooling from L 

corner of oral cavity in AM

Xerostomia:   No

Supplemental Oxygen Use: No 

COPD Dx:  No

Pain with Swallow:  None 



Pt was seen at the bedside for clinical swallowing assessment. RN present 

throughout encounter. Oral mechanism examination was notable for subtle, L 

facial weakness/droop at rest in addition to mild difficulty with L lingual 

deviation. Pt endorsed decreased L sided sensation to tactile stimulation, 

extending from chin to forehead and ceasing at scalp. Administered PO trials of 

thin liquids via straw (single, consecutive), mechanically altered solids, and 

regular solids. No overt indicators of aspiration or oropharyngeal dysphagia 

observed. Pt was initially impulsive with administration of PO trials, but 

modified his rate of intake with single verbal cue. He was able to safely and 

efficiently clear oral cavity without evidence of oral residue. However, pt was 

encouraged to complete periodic lingual sweep (particularly on L) to avoid oral 

stasis in the setting of decreased L sided sensation/attention, and to minimize 

risk for post-swallow aspiration. 



At this time, pt is considered safe for continuance of a regular diet, thin 

liquids with adherence to general precautions as outlined below. Reviewed 

results, recommendations, and general dysphagia education at the bedside with RN

present. Further skilled interventions do not appear indicated for dysphagia. 

However, the pt may continue to experience difficulty with self-feeding as a 

result of L inattention and deficits in executive functioning skills (reduced 

self-monitoring, impulsiveness).  Encouraged staff to support as indicated. 



NITIN: Level 6 



Aspiration Risk:  low 



RECOMMENDATIONS

1. Diet: regular solids, thin liquids, 

2. Medications: whole, one at a time, with thin liquids

3. Compensatory Techniques: ensure upright positioning during PO intake, lingual

or finger sweep as needed to clear residue, regular oral hygiene

4. Supervision: not warranted; provide intermittent reminders to reduce rate of 

intake and monitor potentially impulsive behaviors. 



COGNITIVE LINGUISTIC ASSESSMENT

Cognitive linguistic assessment also completed at the bedside, illustrating mild

deficits with primary area of impairment noted in attention and executive 

function skills.  Etiology is consistent with R frontal CVA. 



Cognitive linguistic assessment procedures focused on informal analysis paired 

with the Parag Cognitive Assessment (MOCA), version 7.3. Pt achieved a score 

of 25/30 (>26/30=WNL). Errors were noted during tasks requiring focused 

attention and executive function skills. The pt struggled with cognitive 

organization for completion of prospective thinking/planning tasks. He further 

demonstrated difficulty with self-monitoring, including rapid rate of intake 

with self-administration of PO trials during bedside swallow evaluation. The pt 

was somewhat hyperverbose throughout encounter, which may also be attributed to 

tearfulness associated with CVA and feeling reportedly anxious about potential

deficits. Though visual neglect was not observed, reduced L sided proprioception

was noted while attempting to locate objects on L side of body in space. 

Relative areas of strength were noted in memory (immediate, working), language, 

mental abstraction, and orientation. Pt also exhibits emerging insight into 

deficits.  



The pt appears to be functioning moderately below baseline. Family not present 

to verify prior level of function; however, pt intends to return to work with 

goal of relocation for new employment in [place]. Recommend acute rehab placement at 

discharge to support return to high PLOF following acute physical and cognitive 

changes. Further ST interventions are warranted in an acute care setting to 

assist with functional cognitive linguistic skills to support integration within

hospital environment and to optimize transition to next level of care. 



RECOMMENDATIONS

1. ST 4x/wk 

2. D/C to acute rehab



PROGNOSIS: Good, high PLOF, high motivation, good insight



PLAN OF CARE

Short Term Goals

1. The patient will identify appropriate goals for daily activity in hospital 

environment, and generate a logical plan for achievement of goal when provided 

with min assist for use of organization/planning strategies. 



Thank you for this referral. Please call [phone] to contact  with any 

questions or concerns.   



Effie Villaseñor M.S., CCC-SLP     



















[*]

MTDD

## 2019-07-24 NOTE — EKG
FACILITY: [facility] 

 

PATIENT NAME: MARTINA MILLARD

: [id number]

MR: [id number]

V: [id number]

EXAM DATE: 

ORDERING PHYSICIAN: DWIGHT HEBERT

TECHNOLOGIST: LESTER

 

Test Reason : WEAKNESS

Blood Pressure : ***/*** mmHG

Vent. Rate : 062 BPM     Atrial Rate : 062 BPM

   P-R Int : 198 ms          QRS Dur : 090 ms

    QT Int : 426 ms       P-R-T Axes : 039 005 032 degrees

   QTc Int : 432 ms

 

Normal sinus rhythm

Normal ECG

No previous ECGs available

Confirmed by Srini Mendez ([id number]) on 2019 7:14:59 AM

 

Referred By:             Confirmed By:Srini Clarke

## 2019-07-25 VITALS — SYSTOLIC BLOOD PRESSURE: 136 MMHG | DIASTOLIC BLOOD PRESSURE: 89 MMHG

## 2019-07-25 VITALS — SYSTOLIC BLOOD PRESSURE: 125 MMHG | DIASTOLIC BLOOD PRESSURE: 77 MMHG

## 2019-07-25 VITALS — DIASTOLIC BLOOD PRESSURE: 79 MMHG | SYSTOLIC BLOOD PRESSURE: 124 MMHG

## 2019-07-25 VITALS — DIASTOLIC BLOOD PRESSURE: 77 MMHG | SYSTOLIC BLOOD PRESSURE: 129 MMHG

## 2019-07-25 VITALS — DIASTOLIC BLOOD PRESSURE: 76 MMHG | SYSTOLIC BLOOD PRESSURE: 124 MMHG

## 2019-07-25 VITALS — SYSTOLIC BLOOD PRESSURE: 107 MMHG | DIASTOLIC BLOOD PRESSURE: 74 MMHG

## 2019-07-25 LAB
ALT SERPL-CCNC: 37 U/L (ref 0–56)
AST SERPL-CCNC: 23 U/L (ref 0–35)
CHOLEST SERPL-MCNC: 125 MG/DL (ref 75–200)
CREAT SERPL-MCNC: 0.9 MG/DL (ref 0.66–1.25)
GLUCOSE SERPL-MCNC: 114 MG/DL (ref 75–110)
HDLC SERPL-MCNC: 54 MG/DL
LDLC SERPL CALC-MCNC: 42 MG/DL
LDLC SERPL-MCNC: 42 MG/DL
TRIGL SERPL-MCNC: 149 MG/DL (ref 0–250)

## 2019-07-25 RX ADMIN — ENOXAPARIN SODIUM SCH MG: 100 INJECTION SUBCUTANEOUS at 09:41

## 2019-07-25 RX ADMIN — CLOPIDOGREL SCH MG: 75 TABLET, FILM COATED ORAL at 09:40

## 2019-07-25 RX ADMIN — SIMVASTATIN SCH MG: 20 TABLET, FILM COATED ORAL at 21:06

## 2019-07-25 NOTE — HOSPITALIST PROGRESS NOTE
Subjective


Progress Notes


Subjective


He notes some persistent left arm/hand weakness. He is alert and oriented x3. He


follows all commands, but does require second command occasionally.





Physical Exam





Vital Signs








  Date Time  Temp Pulse Resp B/P (MAP) Pulse Ox O2 Delivery O2 Flow Rate FiO2


 


7/25/19 07:36     94 Room Air  


 


7/25/19 07:19 98.0 65  136/89 (105)    


 


7/25/19 03:00   16    1.0 














Intake and Output 


 


 7/25/19





 07:03


 


Intake Total 937 ml


 


Balance 937 ml


 


 


 


Intake Oral 937 ml


 


# Voids 5


 


# Bowel Movements 1








General Appearance:  Alert, Awake


Neuro:  Other (Very mild left upper weakness in all groups/even less noticeable 


in LLE. Left pronator drift is present.)


Neck:  No Masses


Cardiovascular:  Regular Rate and Rhythm


Respiratory:  Clear to Auscultation


GI:  Soft and Non-Tender


Extremities:  Warm, Perfused


Psych:  Alert & Oriented X3


Result Diagram:  


7/24/19 0329                                                                    


           7/25/19 0510














Item Value  Date Time


 


Cholesterol/HDL Ratio 2.3 7/25/19 0510


 


Cholesterol Ratio (LDL/HDL) 0.77 7/25/19 0510


 


Percent HDL Cholesterol 42.0 % 7/25/19 0510


 


HDL Cholesterol 54 mg/dl 7/25/19 0510


 


VLDL Cholesterol 30 mg/dl 7/25/19 0510


 


LDL Cholesterol 42 mg/dl 7/25/19 0510


 


Cholesterol Level 126 mg/dl 7/25/19 0510


 


Triglycerides Level 149 mg/dl 7/25/19 0510


 


Albumin 3.9 g/dl 7/25/19 0510


 


Total Protein 6.4 g/dl 7/25/19 0510


 


Alkaline Phosphatase 36 U/L 7/25/19 0510


 


Alanine Aminotransferase (ALT/SGPT) 37 U/L 7/25/19 0510


 


Aspartate Amino Transf (AST/SGOT) 23 U/L 7/25/19 0510


 


Total Bilirubin 1.0 mg/dl 7/25/19 0510


 


Calcium Level 9.3 mg/dl 7/25/19 0510








Monitor Interpretation:  Normal Sinus Rhythm





Assessment and Plan


Problems:  


(1) CVA (cerebrovascular accident)


Status:  Acute


Assessment & Plan:  MRI - Diffusion restriction involving the perisylvian right 


frontal lobe. CTA with normal vascular imaging, no evidence of hemorrhage. 


Patient presented outside TPA window. He has been started on Plavix due to 


patient allergy to ASA. We have resumed simvastatin. PT/OT/ST seeing him and 


have recommended acute rehab. There will be some logistical problems as he is in


process of moving from [place] to [place]. Continue to monitor on 


telemetry, but no abnormalities as of yet. Echocardiogram with bubble study 


still pending. 





(2) HTN (hypertension)


Status:  Chronic


Assessment & Plan:  Will continue to hold losartan, allow permissive HTN. He has


not had any significant elevation of his BP to this point (max ). 








Exam


Sepsis Risk:  No Definite Risk





Problem Qualifiers





(1) CVA (cerebrovascular accident):  


CVA mechanism:  embolism  Precerebral and cerebral artery:  middle cerebral 


artery  Laterality of affected vessel:  right  Qualified Codes:  I63.411 - 


Cerebral infarction due to embolism of right middle cerebral artery








MIGUELANGEL MAHONEY MD            Jul 25, 2019 09:09

## 2019-07-25 NOTE — NUR
ST IMPRESSION

Pt continues with deficits in executive function skills and attention. 

Cognitive impairments are consistent with R frontal CVA. Pt exhibited

mildly improved self-monitoring skills vs initial encounter, but continues 

to require redirection to minimize hyperverbose tendencies and to support 

attention to tasks or auditory instructions. Short-term memory and insight 

remain areas of strength, despite difficulty with self-regulating behaviors. 

Pt indep recalled this SLP, immediately verbalized acknowledgement of exec 

fxn deficits, and recalled 5-item list from [facility] administration on previous 

day. Pt appropriately identified areas of physical and cognitive impairment, 

personal strengths, and goals for rehabilitative improvement with mod to min 

cues. Pt is highly motivated, remains a great candidate for acute rehab at 

discharge. Recommend continued ST services in an acute rehab setting to 

address higher level, executive functioning deficits and to support return to 

PLOF.

## 2019-07-25 NOTE — NUR
Physical Therapy Impression



Noted improvement in Pt's L UE and LE ataxia and also improved attention

to L side.  Pt continues to demonstrate functional mobility and balance

impairments consistent with R-sided CVA and would benefit from acute rehab

in order to increase safety and independence in order to return to prior

level of function.





Physical Therapy Goals



1.  Independent bed mobility.

2.  Independent transfers.

3.  Mod I ambulation x 150' with least restrictive device.

4.  Ascend/descend 4 stairs SBA.



Patient's Goals

## 2019-07-25 NOTE — NUR
Physical Therapy Impression



Pt participated in [place] Balance Scale scoring 50/56 indicating low fall

risk.  Pt safe to be independent in hospital room.





Physical Therapy Goals



1.  Independent bed mobility.

2.  Independent transfers.

3.  Mod I ambulation x 150' with least restrictive device.

4.  Ascend/descend 4 stairs SBA.



Patient's Goals

## 2019-07-25 NOTE — NUR
Occupational Therapy Impression





CGA/Min A ambulation x200ft with no AD. Decreased balance and left side

neglect persists requiring occasional cues and assist from OT. SBA

toileting. Ther act to include left and and UE (see note). Pt

demonstrating improved strength and AROM in left hand this date. Strongly

recommend acute rehab.



Occupational Therapy Goals



1) Pt will be SBA UB/LB dressing.

2) Pt will be educated on visual stratagies for left side neglect.

3) Pt will be educated on left hand AROM/AAROM.



Patient's Goal

## 2019-07-26 VITALS — DIASTOLIC BLOOD PRESSURE: 83 MMHG | SYSTOLIC BLOOD PRESSURE: 124 MMHG

## 2019-07-26 VITALS — SYSTOLIC BLOOD PRESSURE: 133 MMHG | DIASTOLIC BLOOD PRESSURE: 78 MMHG

## 2019-07-26 VITALS — SYSTOLIC BLOOD PRESSURE: 97 MMHG | DIASTOLIC BLOOD PRESSURE: 57 MMHG

## 2019-07-26 VITALS — DIASTOLIC BLOOD PRESSURE: 74 MMHG | SYSTOLIC BLOOD PRESSURE: 118 MMHG

## 2019-07-26 RX ADMIN — ENOXAPARIN SODIUM SCH MG: 100 INJECTION SUBCUTANEOUS at 09:39

## 2019-07-26 RX ADMIN — CLOPIDOGREL SCH MG: 75 TABLET, FILM COATED ORAL at 09:39

## 2019-07-26 RX ADMIN — SIMVASTATIN SCH MG: 20 TABLET, FILM COATED ORAL at 20:24

## 2019-07-26 NOTE — HOSPITALIST PROGRESS NOTE
Subjective


Progress Notes


Subjective


The patient denies new complaints. He notes he is getting better.





Physical Exam





Vital Signs








  Date Time  Temp Pulse Resp B/P (MAP) Pulse Ox O2 Delivery O2 Flow Rate FiO2


 


7/26/19 06:52 98.8 67  124/83 (97) 89 Room Air  


 


7/26/19 03:44   16     


 


7/25/19 03:00       1.0 














Intake and Output 


 


 7/26/19





 07:03


 


Intake Total 1510 ml


 


Balance 1510 ml


 


 


 


Intake Oral 1510 ml


 


# Voids 3


 


# Bowel Movements 1








General Appearance:  Alert, Awake, No Acute Distress


Neuro:  Other (L biceps and triceps 4+/5 compared with R biceps triceps 5/5 but 


patient is right handed. Speech was normal today. )


Eyes:  PERRLA


Cardiovascular:  Regular Rate and Rhythm


Respiratory:  Clear to Auscultation


GI:  Soft and Non-Tender


Extremities:  Warm, Perfused


Psych:  Alert & Oriented X3, Appropriate Mood & Affect


Result Diagram:  


7/24/19 0329                                                                    


           7/25/19 0510





Monitor Interpretation:  Normal Sinus Rhythm





Assessment and Plan


Problems:  


(1) CVA (cerebrovascular accident)


Status:  Acute


Assessment & Plan:  MRI - Diffusion restriction involving the perisylvian right 


frontal lobe. CTA with normal vascular imaging, no evidence of hemorrhage. 


Patient presented outside TPA window. He has been started on Plavix due to 


patient allergy to ASA. We have resumed simvastatin. Lipid panel was normal (on 


statin). PT/OT/ST seeing him and recommended acute rehab. ARU in [place] was 


consulted and denied patient as they felt he was doing well and could do OP 


therapy rather than inpatient. Discharge will present some logistical problems 


as he is in process of moving from [place] to [place]. Telemetry has shown 


no abnormalities so will DC so the patient can move around more easily. Echo


cardiogram with bubble study was unremarkable. 





(2) HTN (hypertension)


Status:  Chronic


Assessment & Plan:  Will continue to hold losartan, allow permissive HTN. He has


not had any significant elevation of his BP to this point (max ). 





Time Spent on Plan of Care:  < 30 min





Exam


Sepsis Risk:  No Definite Risk





Problem Qualifiers





(1) CVA (cerebrovascular accident):  


CVA mechanism:  embolism  Precerebral and cerebral artery:  middle cerebral 


artery  Laterality of affected vessel:  right  Qualified Codes:  I63.411 - 


Cerebral infarction due to embolism of right middle cerebral artery








HALLEY MAHONEY MD             Jul 26, 2019 10:02

## 2019-07-26 NOTE — NUR
Physical Therapy Impression



Focus on Neuro re-education activities including:  fast walking, slow

walking, tandem walking forward/backward, walking backward, walking with

both horizontal and vertical head turns, single leg stance both R and L,

tandem stance, feet together with eyes closed, alternating step-ups

forward, alternating step-ups diagonally.  Pt required CGA/SBA for all

activities and minimal UE support during dynamic balance activities.





Physical Therapy Goals



1.  Independent bed mobility.

2.  Independent transfers.

3.  Mod I ambulation x 150' with least restrictive device.

4.  Ascend/descend 4 stairs SBA.



Patient's Goals

## 2019-07-26 NOTE — NUR
Occupational Therapy Impression





Therapeutic activities focused on left side attention, sensory

re-education and left hand strength/coordination. Pt requiring visual

compensatory strategies to complete tasks due to paresthesia in left hand.

Pt will benefit from continued OT services to address left UE function and

short-term memory/executive function. HEP provided, pt motivated to engage

(I)ly.



Occupational Therapy Goals



1) Pt will be SBA UB/LB dressing.

2) Pt will be educated on visual stratagies for left side neglect.

3) Pt will be educated on left hand AROM/AAROM.



Patient's Goal

## 2019-07-27 VITALS — SYSTOLIC BLOOD PRESSURE: 135 MMHG | DIASTOLIC BLOOD PRESSURE: 96 MMHG

## 2019-07-27 VITALS — DIASTOLIC BLOOD PRESSURE: 79 MMHG | SYSTOLIC BLOOD PRESSURE: 123 MMHG

## 2019-07-27 VITALS — DIASTOLIC BLOOD PRESSURE: 77 MMHG | SYSTOLIC BLOOD PRESSURE: 129 MMHG

## 2019-07-27 VITALS — SYSTOLIC BLOOD PRESSURE: 128 MMHG | DIASTOLIC BLOOD PRESSURE: 78 MMHG

## 2019-07-27 VITALS — SYSTOLIC BLOOD PRESSURE: 132 MMHG | DIASTOLIC BLOOD PRESSURE: 82 MMHG

## 2019-07-27 RX ADMIN — ENOXAPARIN SODIUM SCH MG: 100 INJECTION SUBCUTANEOUS at 09:25

## 2019-07-27 RX ADMIN — CLOPIDOGREL SCH MG: 75 TABLET, FILM COATED ORAL at 09:25

## 2019-07-27 RX ADMIN — SIMVASTATIN SCH MG: 20 TABLET, FILM COATED ORAL at 20:13

## 2019-07-27 NOTE — NUR
Physical Therapy Impression



Pt agreeable to therapy session with no new complaints today. Emphasis of

session on pathfinding with long distance ambulation. Pt tolerated

ambulation x1000' with no AD and Nelli, with good demonstration of way

finding to/from room. Instruction for stair negotiation, pt tolerated

asc/desc 2x6 stairs with SBA and R) railing and step over gait. PT

instruction for standing balance tasks, narrow MARGARITO with EO and EC,

tandem stance with EO and EC as well as uneven surface with EC, pt with

increased postural sway and difficulty with tandem stance and eyes closed, CGA provided.

Pt will benefit from further rehab to address higher level dynamic balance

tasks.





Physical Therapy Goals



1.  Independent bed mobility.

2.  Independent transfers.

3.  Mod I ambulation x 150' with least restrictive device.

4.  Ascend/descend 4 stairs SBA.



Patient's Goals

## 2019-07-27 NOTE — HOSPITALIST PROGRESS NOTE
Subjective


Progress Notes


Subjective


No acute events overnight. He states he can notice improvements in his motor 


control and sensation.





Patient Complains of:


Neurological:  Weakness (States he can notice he has some slight left sided 


weakness and sensation deficits, but that he feels it is improving. )


Cardiovascular:  No: Chest Pain, Palpitations


Respiratory:  No: Congestion, Shortness of Breath


Gastrointestinal:  No Nausea, No Vomiting





Physical Exam





Vital Signs








  Date Time  Temp Pulse Resp B/P (MAP) Pulse Ox O2 Delivery O2 Flow Rate FiO2


 


7/27/19 09:17 98.3 71 18 132/82 (99) 93 Room Air  


 


7/25/19 03:00       1.0 














Intake and Output 


 


 7/27/19





 01:03


 


Intake Total 1190 ml


 


Balance 1190 ml


 


 


 


Intake Oral 1190 ml


 


# Voids 5








General Appearance:  Alert, No Acute Distress


Neuro:  Other (LUE strength 4+/5, LLE Strength 4+/5. Sensation deficits noted to


soft touch on L. foot, Unable to Identify correctly which toe was being touched.


 )


Eyes:  PERRLA


Cardiovascular:  Regular Rate and Rhythm


Respiratory:  No Respiratory Distress, Clear to Auscultation


GI:  Soft and Non-Tender


Result Diagram:  


7/24/19 0329                                                                    


           7/25/19 0510





Monitor Interpretation:  Normal Sinus Rhythm





Assessment and Plan


Problems:  


(1) CVA (cerebrovascular accident)


Status:  Acute


Assessment & Plan:  MRI - Diffusion restriction involving the perisylvian right 


frontal lobe. CTA with normal vascular imaging, no evidence of hemorrhage. 


Patient presented outside TPA window. He has been started on Plavix due to 


patient allergy to ASA. We have resumed simvastatin. Lipid panel was normal (on 


statin). PT/OT/ST seeing him and recommended acute rehab. ARU in [place] was 


consulted and denied patient as they felt he was doing well and could do OP 


therapy rather than inpatient, however ST is evaluating him today (7/27) for 


cognitive deficits which could impact inpatient vs outpatient needs.  Discharge 


will present some logistical problems as he is in process of moving from 


[place] to [place]. Telemetry dc'd with no abnormal findings. 


Echocardiogram with bubble study was unremarkable. 





(2) HTN (hypertension)


Status:  Chronic


Assessment & Plan:  Will continue to hold losartan, allow permissive HTN. He has


not had any significant elevation of his BP to this point (max ). 








Exam


Sepsis Risk:  No Definite Risk





Problem Qualifiers





(1) CVA (cerebrovascular accident):  


CVA mechanism:  embolism  Precerebral and cerebral artery:  middle cerebral 


artery  Laterality of affected vessel:  right  Qualified Codes:  I63.411 - 


Cerebral infarction due to embolism of right middle cerebral artery








DARIEN ROSENTHAL                Jul 27, 2019 09:48

## 2019-07-27 NOTE — SLP ASSESSMENT
INITIAL SPEECH THERAPY EVALUATION REPORT

Cognitive Linguistic Follow-up Assessment

Patient Name:  Jan Croft

Date of Assessment: 19,   Initial assessment 19

Patient : 1960, 58yo

Clinician:  Lori Mcgowan M.S., CCC-SLP    

Treatment Dx:  



BACKGROUND

The patient is a 59-year-old male admitted to [facility] on 19 with L sided 

weakness and numbness as well as general confusion and cognitive changes. Brain 

MRI revealed diffusion restriction involving the perisylvian right frontal lobe.

 



Primary Medical Diagnosis: R frontal CVA

Past Medical Hx: HTN

Pain Scale (0-10): 0

LOC / Participation:   pleasant, participatory

Prior Level of Function:  independent, relocating for new job in [place]



Initial ST assessment on 19   MOCA was administered and pt scored 25/30 

with primary areas of deficit including attention and executive function.  Pt 

functioning with moderate cognitive deficit overall.  



Follow-Up assessment 19 .  The MOCA was administered with pt scoring 24/30.

 Attention continues to be an area of primary deficit.   This was noted 

throughout the assessment and per nursing/family report such as the patient 

failing to locate recently placed objects around his hospital room.  The patient

struggled with delayed recall tasks, failing to recall any of 5 verbally 

presented words following a 3min delay.  Performance on executive function tasks

was improved over last MOCA however pt did misplace the  hands during the clock 

drawing task.  He later self-corrected given extended completion time.  In 

addition, the patient demonstrates some general  confusion and disorientation.  

He requires use of external aides to recalls current date and place. He 

continues to demonstrate difficulty with self-monitoring during task completion 

and tends to complete tasks quickly and then return to look for errors. This is 

not a consistently successful technique.   Flat affect was noted during the 

assessment which is particularly significant as the patient works as a 

psychotherapist for a living.  The family endorses this change. 



Safety at home is a concern.  The patient and his spouse are in the process of 

relocating to a new state for work.   The patient will be home alone in a new 

house and city.   The patient's cognitive deficits may impact choices regarding 

his safety in and around the home including driving, safety with mobility, IADLs

such as showering and meal preparation.   In addition, the lack of a 

communicative partner in the home for the majority of the day will limit his 

opportunities to improve cognitive-linguistic skills to PLOF for return to work 

which is his primary concern.   



The patient continues to function moderately below baseline and demonstrates 

little overall change in function since initial assessment.  Further ST 

interventions are warranted and speech therapy recommends acute rehab placement 

at discharge to support return to independent PLOF including return to work as 

the family depends on the patient's income.  



RECOMMENDATIONS

1. ST 4x/wk 

2. D/C to acute rehab



PROGNOSIS: Good, high PLOF



PLAN OF CARE

Short Term Goals

1. The patient will identify appropriate goals for daily activity in hospital 

environment, and generate a logical plan for achievement of goal when provided 

with min assist for use of organization/planning strategies. 

2. The patient will demonstrate functional/work-related attention to task skills

that allow him to self monitor and self correct in situ, independently and at  

95% accuracy.

3. The patient will demonstrate and identify appropriate affect during work 

related tasks independently at 95% accuracy. 

4.  The patient will demonstrate function/work-related delayed recall skills 

independently and at 95% accuracy. 

LTG

1.  The patient will demonstrate a score on the MOCA of 28 or above (WNL). 









Thank you for this referral. Please call [phone] to contact  with any 

questions or concerns.   



Lori Mcgowan M.S., CCC-SLP     













BHARTID

## 2019-07-28 VITALS — DIASTOLIC BLOOD PRESSURE: 79 MMHG | SYSTOLIC BLOOD PRESSURE: 130 MMHG

## 2019-07-28 VITALS — DIASTOLIC BLOOD PRESSURE: 78 MMHG | SYSTOLIC BLOOD PRESSURE: 131 MMHG

## 2019-07-28 VITALS — SYSTOLIC BLOOD PRESSURE: 136 MMHG | DIASTOLIC BLOOD PRESSURE: 90 MMHG

## 2019-07-28 VITALS — SYSTOLIC BLOOD PRESSURE: 147 MMHG | DIASTOLIC BLOOD PRESSURE: 88 MMHG

## 2019-07-28 RX ADMIN — CLOPIDOGREL SCH MG: 75 TABLET, FILM COATED ORAL at 09:05

## 2019-07-28 RX ADMIN — SIMVASTATIN SCH MG: 20 TABLET, FILM COATED ORAL at 20:34

## 2019-07-28 RX ADMIN — ENOXAPARIN SODIUM SCH MG: 100 INJECTION SUBCUTANEOUS at 09:07

## 2019-07-28 RX ADMIN — Medication PRN MG: at 22:55

## 2019-07-28 RX ADMIN — Medication PRN MG: at 09:05

## 2019-07-28 NOTE — HOSPITALIST PROGRESS NOTE
Subjective


Progress Notes


Subjective


The patient complains of a mild frontal headache today. He states he 


occasionally gets a headache at home and takes Tylenol for this.





Physical Exam





Vital Signs








  Date Time  Temp Pulse Resp B/P (MAP) Pulse Ox O2 Delivery O2 Flow Rate FiO2


 


7/28/19 03:44  57 16 136/90 (105) 92 Room Air  


 


7/27/19 20:04 98.1       


 


7/25/19 03:00       1.0 














Intake and Output 


 


 7/28/19





 07:03


 


Intake Total 1870 ml


 


Balance 1870 ml


 


 


 


Intake Oral 1870 ml


 


# Voids 4


 


# Bowel Movements 1








General Appearance:  Alert, Awake, No Acute Distress


Neuro:  Other (Very mild difference in strength when testing triceps. Right side


is 5/5, left side is 4+/5. Short term memory appears to be a bit improved. [facility] 


patient did not repeat himself today. )


Result Diagram:  


7/24/19 0329                                                                    


           7/25/19 0510





Monitor Interpretation:  Normal Sinus Rhythm





Assessment and Plan


Problems:  


(1) CVA (cerebrovascular accident)


Status:  Acute


Assessment & Plan:  MRI - Diffusion restriction involving the perisylvian right 


frontal lobe. CTA with normal vascular imaging, no evidence of hemorrhage. 


Patient presented outside TPA window. He has been started on Plavix due to 


patient allergy to ASA. We have resumed simvastatin. Lipid panel was normal (on 


statin). PT/OT/ST seeing him and recommended acute rehab. ARUs in [place] have been 


sent referrals. Initially the patient was denied at some ARUs as his cognitive 


deficits were not well documented and his L sided weakness and balance were 


improving rapidly. Now it is clear that he has significant cognitive deficits as


noted by SLP on a second evaluation focusing on the cognitive issues.  SLP did a


repeat evaluation on Saturday (7/27) and documented cognitive deficits which 


could impact inpatient vs outpatient needs.  Discharge will present some 


logistical problems as he is in process of moving from [place] to [place]. 


Telemetry dc'd with no abnormal findings. Echocardiogram with bubble study was 


unremarkable. 





(2) HTN (hypertension)


Status:  Chronic


Assessment & Plan:  Will continue to hold losartan, allow permissive HTN. He has


not had any significant elevation of his BP to this point (max ). 





Time Spent on Plan of Care:  < 30 min





Exam


Sepsis Risk:  No Definite Risk





Problem Qualifiers





(1) CVA (cerebrovascular accident):  


CVA mechanism:  embolism  Precerebral and cerebral artery:  middle cerebral 


artery  Laterality of affected vessel:  right  Qualified Codes:  I63.411 - 


Cerebral infarction due to embolism of right middle cerebral artery








HALLEY MAHONEY MD             Jul 28, 2019 08:58

## 2019-07-29 VITALS — DIASTOLIC BLOOD PRESSURE: 83 MMHG | SYSTOLIC BLOOD PRESSURE: 132 MMHG

## 2019-07-29 VITALS — SYSTOLIC BLOOD PRESSURE: 128 MMHG | DIASTOLIC BLOOD PRESSURE: 80 MMHG

## 2019-07-29 VITALS — SYSTOLIC BLOOD PRESSURE: 131 MMHG | DIASTOLIC BLOOD PRESSURE: 88 MMHG

## 2019-07-29 VITALS — DIASTOLIC BLOOD PRESSURE: 79 MMHG | SYSTOLIC BLOOD PRESSURE: 129 MMHG

## 2019-07-29 VITALS — SYSTOLIC BLOOD PRESSURE: 137 MMHG | DIASTOLIC BLOOD PRESSURE: 92 MMHG

## 2019-07-29 RX ADMIN — ENOXAPARIN SODIUM SCH MG: 100 INJECTION SUBCUTANEOUS at 09:25

## 2019-07-29 RX ADMIN — Medication PRN MG: at 07:12

## 2019-07-29 RX ADMIN — SIMVASTATIN SCH MG: 20 TABLET, FILM COATED ORAL at 21:00

## 2019-07-29 RX ADMIN — CLOPIDOGREL SCH MG: 75 TABLET, FILM COATED ORAL at 09:24

## 2019-07-29 NOTE — NUR
Physical Therapy Impression



Pt able to perform bed mobility and transfers from low surfaces

independently.  Pt ambulated with a mild L ataxic gait pattern without

loss of balance independently.  Pt able to negotiate tight spaces and

weave around furniture without loss of balance.  Pt negotiated total of 24

steps with use of rail at Mod I level.  No safety concerns at this time

with stair negotiation.

Pt demonstrates a functional level safe and independent enough to be

safely discharged from the hospital.  Recommend Pt continue outpatient

physical therapy to continue to work on higher-level activities.





Physical Therapy Goals



1.  Independent bed mobility.

2.  Independent transfers.

3.  Mod I ambulation x 150' with least restrictive device.

4.  Ascend/descend 4 stairs SBA.



Patient's Goals

## 2019-07-29 NOTE — MEDICAL NUTRITION THERAPY
Nutrition Anthropometrics


Height (Inches):  70.00


Height (Calculated Centimeters:  177.245607


Weight (Pounds):  229


Weight (Calculated Kilograms):  103.901


BMI:  32.9


Hx Weight Loss:  Yes (Intentional Wt loss (Lost ~5lbs in last 6 months))


Vick Nutrition Score:         Adequate 


Vick Nutrition Risk Score:  19


Dietary Referral


Nutrition Risk Factors:      


Nutrition Risk Comment:





Physical Findings


Physical Appearance:  Obese BMI 30-39


Skin Appearance


Skin Appearance:


Edema


Edema Location Modifier:  


Edema Location:               


Type of Edema:                 


Degree of Edema:


Gastrointestinal Symptoms


GI Symtoms:                 


Tube Present:               


Bowel Sounds:              


Recent Bowel Pattern:    


Stool Characteristics:





Nutritional Diagnosis


Nutritional Risk Acuity 3:  Fair Appetite, Eat/Chew Problem


Nutritional Risk Acuity 4:  Good Appetite


Past Medical History:  


HTN, HLD


Nutritional Acuity:  3-Mild


Adjusted Energy Requirement Re:  2147 (Li [place] Equation)


Protein Requirement:  104


Fluid Requirement:  2147 (1mL/kcal)


Diet Type:  NPO (Nothing by Mouth)





Nutritional Education


Nutrition Education Topic:  Low Na Diet (and Low Saturated Fat Diet), Other


Learning Barriers:  Cognitive


Learning Readiness:  Interested


Teaching Methods:  Discussion


Response to Teaching:  Verbalize understanding


Teaching Recipient:  Patient, Significant Other





Nutrition Monitoring & Eval


Nutrition Goals:  Eat % Meal


Nutrition Follow-Up:  Good Intake


RD Patient Assessment Time:  60 minutes


RD Assessment Type:  RD Re-Assessment


Patient Nutrition Acuity:  3-Mild


Follow Up Date:  Aug 2, 2019


Nutritional Comment:  


7/24/19-Spoke with pt and his wife this am. Pt admit for CVA. Significant


PMH includes HTN, HLD. Pt appears well nourished, reports eating 3 meals


per day. Usual body weight is ~230 lbs. Pt reports intentional wt loss of


~5lbs over last 6 months. Pt reported some facial numbing. Pt wife asked


when pt could water or food. Spoke with RN and plan is to complete a SLP


evaluation before diet advancement. Communicated plan to wife and pt. Also


provided handout on stroke nutrition therapy focusing on low


sodium/cholesterol. Will continue to monitor diet advancement, po intake,


and any questions on stroke nutrition education.MIRACLE


7/29/19: Spoke with pt and wife this am. Discussed high sodium foods to


avoid. Discussed target of 2000 mg sodium per day. Discussed low sodium as


<5% of DV on food labels. Discussed avoiding foods high in saturated fat.


Will provide another handout for pt prior to discharge .ELENI CHAPA                Jul 29, 2019 10:43

## 2019-07-29 NOTE — NUR
PHYSICAL THERAPY INFORMATION TRANSFER SHEET



BED MOBILITY: 

Independent



TRANSFERS: 

Independent



GAIT: 1000 ' with 

None and 

Independent

 

Weightbearing Status:  



STAIRS: 24  with 

Modified I/ AE.



EXERCISES: 



Verbalizes Needs: Yes

Understands Directions Yes

Cooperative: Yes

Family Teaching: Yes

Physical Therapy Comment:

## 2019-07-29 NOTE — NUR
Occupational Therapy Impression





Therapeutic activities focused on left side attention, sensory

re-education and left hand strength/coordination. Pt safe for discharge

when medically appropriate. Recommend continued OT services.



Occupational Therapy Goals



1) Pt will be SBA UB/LB dressing.

2) Pt will be educated on visual strategies for left side neglect.

3) Pt will be educated on left hand AROM/AAROM.



Patient's Goal

## 2019-07-29 NOTE — NUR
ST IMPRESSION

Cognitive-linguistic interventions completed at the bedside, spouse

present for portion of tx encounter. Addressed deficits in attention 

and exec fxn via facilitation of pt participation in goal-oriented planning

activity. Pt identified appropriate goals for daily activity in hospital 

environment, and generated a logical plan for goal execution with min 

assist. Pt was encouraged to incorporate "self talk" strategy into daily 

routine, in addition to energy conservation techniques (plan, pace, 

prioritize) to further support exec fxn deficits. Pt further participated

in prospective analysis of perceived challenges upon d/c from acute 

hospital environment. Pt acknowledges changes in attention span, exec

fxn skills, pragmatics, and intermittent, persistent difficulties with temporal

orientation. STG1 has been met. New goals (STG 2, 3 & 4) have been 

established as outlined below. Pt and spouse in agreement with POC. Pt

cont to demonstrate steady improvements, though deficits persist in

executive functioning, attention, and pragmatics (prosody, affect, etc).



2. The patient will demonstrate functional/work-related attention to task 

skills that allow him to self monitor and self correct in situation, 

independently and at  95% accuracy.

3. The patient will demonstrate and identify appropriate affect during 

work related tasks independently at 95% accuracy. 

4.  The patient will demonstrate function/work-related delayed recall 

skills independently and at 95% accuracy.

## 2019-07-29 NOTE — HOSPITALIST PROGRESS NOTE
Subjective


Progress Notes


Subjective


He has no complaints this morning. He had no acute events overnight.





Patient Complains of:


Cardiovascular:  No: Chest Pain


Respiratory:  No: Shortness of Breath





Physical Exam





Vital Signs








  Date Time  Temp Pulse Resp B/P (MAP) Pulse Ox O2 Delivery O2 Flow Rate FiO2


 


7/29/19 07:15     93 Room Air  


 


7/29/19 06:57 97.8 51 16 128/80 (96)    














Intake and Output 


 


 7/29/19





 01:03


 


Intake Total 840 ml


 


Balance 840 ml


 


 


 


Intake Oral 840 ml


 


# Voids 2








General Appearance:  Alert, Awake, No Acute Distress, Afebrile


Neuro:  Other (Cognitive defects noted per family)


Cardiovascular:  Regular Rate and Rhythm


Respiratory:  No Respiratory Distress, Clear to Auscultation


GI:  Soft and Non-Tender


Psych:  Alert & Oriented X3, Appropriate Mood & Affect


Result Diagram:  


7/25/19 0510





Monitor Interpretation:  Normal Sinus Rhythm





Assessment and Plan


Problems:  


(1) CVA (cerebrovascular accident)


Status:  Acute


Assessment & Plan:  MRI - Diffusion restriction involving the perisylvian right 


frontal lobe. CTA with normal vascular imaging, no evidence of hemorrhage. 


Patient presented outside TPA window. He has been started on Plavix due to 


patient allergy to ASA. We have resumed simvastatin. Lipid panel was normal (on 


statin). PT/OT/ST seeing him and recommended acute rehab. ARUs in [place] have been 


sent referrals. Initially the patient was denied at some ARUs as his cognitive 


deficits were not well documented and his L sided weakness and balance were 


improving rapidly. Now it is clear that he has significant cognitive deficits as


noted by SLP on a second evaluation focusing on the cognitive issues.  SLP did a


repeat evaluation on Saturday (7/27) and documented cognitive deficits which 


could impact inpatient vs outpatient needs.  Discharge will present some 


logistical problems as he is in process of moving from [place] to [place]. 


Telemetry dc'd with no abnormal findings. Echocardiogram with bubble study was 


unremarkable. 





(2) HTN (hypertension)


Status:  Chronic


Assessment & Plan:  Will continue to hold losartan, allow permissive HTN. He has


not had any significant elevation of his BP to this point (max ). 








Exam


Sepsis Risk:  No Definite Risk





Problem Qualifiers





(1) CVA (cerebrovascular accident):  


CVA mechanism:  embolism  Precerebral and cerebral artery:  middle cerebral 


artery  Laterality of affected vessel:  right  Qualified Codes:  I63.411 - 


Cerebral infarction due to embolism of right middle cerebral artery


(2) HTN (hypertension):  


Hypertension type:  essential hypertension  Qualified Codes:  I10 - Essential 


(primary) hypertension








KIKO LOPEZ [facility]            Jul 29, 2019 12:20

## 2019-07-30 VITALS — DIASTOLIC BLOOD PRESSURE: 80 MMHG | SYSTOLIC BLOOD PRESSURE: 128 MMHG

## 2019-07-30 RX ADMIN — CLOPIDOGREL SCH MG: 75 TABLET, FILM COATED ORAL at 11:11

## 2019-07-30 RX ADMIN — ENOXAPARIN SODIUM SCH MG: 100 INJECTION SUBCUTANEOUS at 11:11

## 2019-07-30 NOTE — HOSPITALIST DEPART
Discharge Summary


Reason for Hosp/Final Diag:  


(1) CVA (cerebrovascular accident)


Status:  Acute


Hospital Course & Plan:  MRI - Diffusion restriction involving the perisylvian 


right frontal lobe. CTA with normal vascular imaging, no evidence of hemorrhage.


Patient presented outside TPA window. He was started on Plavix due to patient 


allergy to ASA. We have resumed simvastatin. Lipid panel was normal (on statin).


PT/OT/ST recommend acute rehab. Referrals were sent for ARUs in [place]. Initially 


the patient was denied at some ARUs as his cognitive deficits were not well 


documented and his L sided weakness and balance were improving rapidly. Now it 


is clear that he has significant cognitive deficits as noted by SLP on a second 


evaluation focusing on the cognitive issues.  SLP did a repeat evaluation on 


Saturday (7/27) and documented cognitive deficits which could impact inpatient 


vs outpatient needs.  Discharge will present some logistical problems as he is 


in process of moving from [place] to [place]. Telemetry show no abnormal 


findings. Echocardiogram with bubble study was unremarkable. Patient will 


discharge to ARU in [place] for continued rehab. 





(2) HTN (hypertension)


Status:  Chronic


Hospital Course & Plan:  Will continue to hold losartan, allow permissive HTN. 


He has not had any significant elevation of his BP to this point (max ). 





Departure


Latest Vital Signs





Vital Signs








 7/25/19 7/30/19 7/30/19





 03:00 07:47 08:26


 


Temp  98.8 


 


Pulse  53 


 


Resp  16 


 


B/P (MAP)  128/80 (96) 


 


Pulse Ox   90


 


O2 Delivery   Room Air


 


O2 Flow Rate 1.0  








Weight (Pounds):  229


Weight (Ounces):  1.0


Result Diagram:  


7/24/19 0329                                                                    


           7/25/19 0510





Condition:  Improved


Discharge:  Rehab Facility


PT/OT Follow Up For:  PT For Strengthening, OT For ADL's, PT Evaluation and 


Treat, ST Evaluation and Treat, OT Evaluation and Treat





Discharge Instructions


Home Meds


Active Scripts


Clopidogrel Bisulfate (CLOPIDOGREL) 75 Mg Tablet, 75 MG PO QDAY, #30 TAB


   Prov:KIKO LOPEZ DAVID FNP         7/30/19


Reported Medications


Simvastatin (SIMVASTATIN) 20 Mg Tablet, 20 MG PO HS, TAB


   7/24/19


Discontinued Reported Medications


Losartan Potassium (LOSARTAN POTASSIUM) 25 Mg Tablet, 25 MG PO QDAY


   7/24/19


Diet:  Regular


Activity:  As Tolerated


Special Instructions:  


Start Plavix. 


Stop Losartan.


Continue Simvastatin.





Venous Thromboembolism


Antithrombotics


Is Pt On Any Antithrombotics?:  Yes





Problem Qualifiers





(1) CVA (cerebrovascular accident):  


CVA mechanism:  embolism  Precerebral and cerebral artery:  middle cerebral art


na  Laterality of affected vessel:  right  Qualified Codes:  I63.411 - Cerebral


infarction due to embolism of right middle cerebral artery


(2) HTN (hypertension):  


Hypertension type:  essential hypertension  Qualified Codes:  I10 - Essential 


(primary) hypertension








KIKO LOPEZ [facility]            Jul 30, 2019 09:19

## 2019-07-31 ENCOUNTER — NURSING HOME VISIT (OUTPATIENT)
Dept: GERIATRICS | Facility: CLINIC | Age: 59
End: 2019-07-31
Payer: COMMERCIAL

## 2019-07-31 VITALS
RESPIRATION RATE: 16 BRPM | SYSTOLIC BLOOD PRESSURE: 137 MMHG | DIASTOLIC BLOOD PRESSURE: 78 MMHG | WEIGHT: 229.4 LBS | OXYGEN SATURATION: 97 % | TEMPERATURE: 97.3 F | HEART RATE: 67 BPM

## 2019-07-31 DIAGNOSIS — I10 BENIGN ESSENTIAL HYPERTENSION: ICD-10-CM

## 2019-07-31 DIAGNOSIS — E78.5 DYSLIPIDEMIA: ICD-10-CM

## 2019-07-31 DIAGNOSIS — I63.411 CEREBROVASCULAR ACCIDENT (CVA) DUE TO EMBOLISM OF RIGHT MIDDLE CEREBRAL ARTERY (H): Primary | ICD-10-CM

## 2019-07-31 PROCEDURE — 99306 1ST NF CARE HIGH MDM 50: CPT | Performed by: INTERNAL MEDICINE

## 2019-07-31 RX ORDER — CLOPIDOGREL BISULFATE 75 MG/1
75 TABLET ORAL DAILY
COMMUNITY
End: 2019-08-26

## 2019-07-31 RX ORDER — SIMVASTATIN 20 MG
20 TABLET ORAL AT BEDTIME
COMMUNITY
End: 2019-08-07

## 2019-07-31 NOTE — PROGRESS NOTES
[place] [facility]  INITIAL VISIT NOTE  July 31, 2019    PRIMARY CARE PROVIDER AND CLINIC:  No Ref-Primary, Physician No address on file    Chief Complaint   Patient presents with     Hospital F/U     Establish Care       HPI:    Lenny Lomeli is a 59 year old  (1960) male who was seen at [facility] on July 31, 2019 for an initial visit. Medical history is notable for hypertension, dyslipidemia, cervical spine degenerative disc disease, and recent acute right MCA stroke on July 24, 2019 requiring hospitalization at [facility] in [place], [place].  He was initially presented with left sided weakness and numbness and work-up revealed diffuse restriction involving the perisylvian right frontal lobe.  CTA showed normal vascular imaging.  The patient had presented outside TPA window and given his allergy to aspirin, he was treated with Plavix.  Echocardiogram with bubble study showed normal LV systolic function and no intracardiac shunt.  Per discharge summary, the patient had sinus rhythm upon admission.    He was admitted to this facility for medical management and rehab.     On further questioning, the patient reports no headache, double vision, abnormal speech, chest pain, palpitation, dyspnea, nausea, vomiting, abdominal pain, or fever.  He states that he is at times forgetful and continues to have some residual numbness in his left hand.      CODE STATUS:   CPR/Full code     PAST MEDICAL HISTORY:   Hypertension  Dyslipidemia  Allergic rhinitis  Cervical spine degenerative disc disease  Acute right MCA stroke as outlined above      PAST SURGICAL HISTORY:   Umbilical hernia repair    FAMILY HISTORY:   Significant for dyslipidemia, hypertension, stroke and heart disease in his father, asthma in his mother, diabetes and hyperlipidemia in his sister, and hyperlipidemia, hypertension, and heart disease in his brother.    SOCIAL HISTORY:   Patient is  and works as a psychotherapist.  He  has a 5-year-old child.  He lived in [place] for 32 years and was in process of moving to [place] when his stroke happened.      MEDICATIONS:  Current Outpatient Medications   Medication Sig Dispense Refill     clopidogrel (PLAVIX) 75 MG tablet Take 75 mg by mouth daily       simvastatin (ZOCOR) 20 MG tablet Take 20 mg by mouth At Bedtime         ALLERGIES:     Allergies   Allergen Reactions     Aspirin        ROS:  10 point ROS neg other than the symptoms noted above in the HPI.      PHYSICAL EXAM:  /78   Pulse 67   Temp 97.3  F (36.3  C)   Resp 16   Wt 104.1 kg (229 lb 6.4 oz)   SpO2 97%    Gen: sitting in chair, alert, cooperative and in no acute distress  HEENT: normocephalic; oropharynx clear  Card: RRR, S1, S2, no murmurs  Resp: lungs clear to auscultation bilaterally  GI: abdomen soft, not-tender, non-distended, +BS  MSK: normal muscle tone, no LE edema  Neuro: CX II-XII grossly in tact; ROM in all four extremities grossly in tact, slight decrease in touch sensation in left hand  Psych: alert and oriented x3; normal affect    LABORATORY/IMAGING DATA:  WBC 7, hemoglobin 14.2, platelet count 279,000, sodium 137, potassium 4, creatinine 0.9, blood glucose 114.      ASSESSMENT/PLAN:  Acute right MCA ischemic stroke:  Question embolic versus lacunar infarct.  The imaging is not available for review.  The patient feels that he is forgetful at times and has some numbness feeling in his left hand.  Management as below:  -PT/OT and speech evaluation  -Social work service consult to facilitate discharge and follow-up visits  -Follow-up with neurology within 1 week  -Continue Plavix 75 mg daily and simvastatin 20 mg p.o. daily  -The patient benefits from ZIO Patch versus CardioNet monitoring to rule out paroxysmal atrial fibrillation, however the family is inclined to delaying cardiac monitoring until the patient is evaluated by the neurology in the clinic.  -It seems that the patient has symptoms of  obstructive sleep apnea and therefore he benefits from screening for sleep studies as outpatient.    Hypertension:  Prior to his stroke, the patient was on losartan 25 mg p.o. daily.  This was held due to acute stroke.  His blood pressure is currently 137/78.    -Resume his losartan.    -Monitor blood pressure periodically.  -Follow up with PCP after discharge    Dyslipidemia:  Per record from outside hospital, his lipid panel was normal on statin.  He will continue with simvastatin 20 mg p.o. Daily.    [facility] TIME SPENT: Total time spent with patient visit at the skilled nursing facility was 60 including patient visit, review of past records and and speaking to family. Greater than 50% of total time spent with counseling and coordinating care due to acute stroke    Electronically signed by:  Kt Cunningham MD

## 2019-07-31 NOTE — LETTER
7/31/2019        RE: Lenny Lomeli  [address]  [facility] [address]        [place] [facility]  INITIAL VISIT NOTE  July 31, 2019    PRIMARY CARE PROVIDER AND CLINIC:  No Ref-Primary, Physician No address on file    Chief Complaint   Patient presents with     Hospital F/U     Establish Care       HPI:    Lenny Lomeli is a 59 year old  (1960) male who was seen at [facility] on July 31, 2019 for an initial visit. Medical history is notable for hypertension, dyslipidemia, cervical spine degenerative disc disease, and recent acute right MCA stroke on July 24, 2019 requiring hospitalization at [facility] in [place], [place].  He was initially presented with left sided weakness and numbness and work-up revealed diffuse restriction involving the perisylvian right frontal lobe.  CTA showed normal vascular imaging.  The patient had presented outside TPA window and given his allergy to aspirin, he was treated with Plavix.  Echocardiogram with bubble study showed normal LV systolic function and no intracardiac shunt.  Per discharge summary, the patient had sinus rhythm upon admission.    He was admitted to this facility for medical management and rehab.     On further questioning, the patient reports no headache, double vision, abnormal speech, chest pain, palpitation, dyspnea, nausea, vomiting, abdominal pain, or fever.  He states that he is at times forgetful and continues to have some residual numbness in his left hand.      CODE STATUS:   CPR/Full code     PAST MEDICAL HISTORY:   Hypertension  Dyslipidemia  Allergic rhinitis  Cervical spine degenerative disc disease  Acute right MCA stroke as outlined above      PAST SURGICAL HISTORY:   Umbilical hernia repair    FAMILY HISTORY:   Significant for dyslipidemia, hypertension, stroke and heart disease in his father, asthma in his mother, diabetes and hyperlipidemia in his sister, and hyperlipidemia, hypertension, and heart  disease in his brother.    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  He lived in [place] for 32 years and was in process of moving to [place] when his stroke happened.      MEDICATIONS:  Current Outpatient Medications   Medication Sig Dispense Refill     clopidogrel (PLAVIX) 75 MG tablet Take 75 mg by mouth daily       simvastatin (ZOCOR) 20 MG tablet Take 20 mg by mouth At Bedtime         ALLERGIES:     Allergies   Allergen Reactions     Aspirin        ROS:  10 point ROS neg other than the symptoms noted above in the HPI.      PHYSICAL EXAM:  /78   Pulse 67   Temp 97.3  F (36.3  C)   Resp 16   Wt 104.1 kg (229 lb 6.4 oz)   SpO2 97%    Gen: sitting in chair, alert, cooperative and in no acute distress  HEENT: normocephalic; oropharynx clear  Card: RRR, S1, S2, no murmurs  Resp: lungs clear to auscultation bilaterally  GI: abdomen soft, not-tender, non-distended, +BS  MSK: normal muscle tone, no LE edema  Neuro: CX II-XII grossly in tact; ROM in all four extremities grossly in tact, slight decrease in touch sensation in left hand  Psych: alert and oriented x3; normal affect    LABORATORY/IMAGING DATA:  WBC 7, hemoglobin 14.2, platelet count 279,000, sodium 137, potassium 4, creatinine 0.9, blood glucose 114.      ASSESSMENT/PLAN:  Acute right MCA ischemic stroke:  Question embolic versus lacunar infarct.  The imaging is not available for review.  The patient feels that he is forgetful at times and has some numbness feeling in his left hand.  Management as below:  -PT/OT and speech evaluation  -Social work service consult to facilitate discharge and follow-up visits  -Follow-up with neurology within 1 week  -Continue Plavix 75 mg daily and simvastatin 20 mg p.o. daily  -The patient benefits from ZIO Patch versus CardioNet monitoring to rule out paroxysmal atrial fibrillation, however the family is inclined to delaying cardiac monitoring until the  patient is evaluated by the neurology in the clinic.  -It seems that the patient has symptoms of obstructive sleep apnea and therefore he benefits from screening for sleep studies as outpatient.    Hypertension:  Prior to his stroke, the patient was on losartan 25 mg p.o. daily.  This was held due to acute stroke.  His blood pressure is currently 137/78.    -Resume his losartan.    -Monitor blood pressure periodically.  -Follow up with PCP after discharge    Dyslipidemia:  Per record from outside hospital, his lipid panel was normal on statin.  He will continue with simvastatin 20 mg p.o. Daily.    [facility] TIME SPENT: Total time spent with patient visit at the skilled nursing facility was 60 including patient visit, review of past records and and speaking to family. Greater than 50% of total time spent with counseling and coordinating care due to acute stroke    Electronically signed by:  Kt Cunningham MD                Sincerely,        Kt Cunningham MD

## 2019-08-01 ENCOUNTER — DISCHARGE SUMMARY NURSING HOME (OUTPATIENT)
Dept: GERIATRICS | Facility: CLINIC | Age: 59
End: 2019-08-01
Payer: MEDICAID

## 2019-08-01 VITALS
RESPIRATION RATE: 18 BRPM | SYSTOLIC BLOOD PRESSURE: 112 MMHG | TEMPERATURE: 96.5 F | OXYGEN SATURATION: 96 % | WEIGHT: 229.4 LBS | HEART RATE: 51 BPM | DIASTOLIC BLOOD PRESSURE: 63 MMHG

## 2019-08-01 DIAGNOSIS — I63.511 ACUTE RIGHT MCA STROKE (H): ICD-10-CM

## 2019-08-01 DIAGNOSIS — I10 BENIGN ESSENTIAL HYPERTENSION: Primary | ICD-10-CM

## 2019-08-01 DIAGNOSIS — M47.812 OSTEOARTHRITIS OF CERVICAL SPINE, UNSPECIFIED SPINAL OSTEOARTHRITIS COMPLICATION STATUS: ICD-10-CM

## 2019-08-01 DIAGNOSIS — J30.2 SEASONAL ALLERGIC RHINITIS, UNSPECIFIED TRIGGER: ICD-10-CM

## 2019-08-01 DIAGNOSIS — E78.5 DYSLIPIDEMIA: ICD-10-CM

## 2019-08-01 PROCEDURE — 99316 NF DSCHRG MGMT 30 MIN+: CPT | Performed by: NURSE PRACTITIONER

## 2019-08-01 RX ORDER — LOSARTAN POTASSIUM 25 MG/1
25 TABLET ORAL DAILY
COMMUNITY
End: 2019-08-07 | Stop reason: DRUGHIGH

## 2019-08-01 NOTE — LETTER
8/1/2019        RE: Lenyn Lomeli  [address]  [facility] [address]        [place] [facility] DISCHARGE SUMMARY  PATIENT'S NAME: Lenny Lomeli  YOB: 1960  MEDICAL RECORD NUMBER:  [id number]  Place of Service where encounter took place:  [facility] ([facility]) [[id number]]    PRIMARY CARE PROVIDER AND CLINIC RESPONSIBLE AFTER TRANSFER:   Physician No Ref-Primary, No address on file    Non-[facility] Provider     Transferring providers: ADITYA Hollins CNP, Kt Cunningham MD  Recent Hospitalization/ED:  [facility] in [place], [place] stay 7/24/2019 to 7/31/2019.  Date of SNF Admission: July / 31 / 2019  Date of SNF (anticipated) Discharge: August / 02 / 2019  Discharged to: with family or to location of choice  Cognitive Scores: SLUMS 25/30  Physical Function: ambulates unlimited distance without assistace  DME: none    CODE STATUS/ADVANCE DIRECTIVES DISCUSSION:  Full Code   ALLERGIES: Aspirin    Per recent TCU provider progress notes:   59 year old male with h/o hypertension, dyslipidemia, cervical spine degenerative disc disease, and recent acute right MCA stroke on July 24, 2019 hospitalized with left sided weakness and numbness and work-up revealed diffuse restriction involving the perisylvian right frontal lobe.  CTA showed normal vascular imaging.  Treated with Plavix (allergy to ASA).  Echocardiogram with bubble study showed normal LV systolic function and no intracardiac shunt.  To TCU for therapies. Neuro f/u 1 wk. HTN - resumed losartan. Continues statin for HLD.     DISCHARGE DIAGNOSIS/NURSING FACILITY COURSE:   Benign essential hypertension  Patient is back on Cozaar and SBP range 112--145. No s/s edema.     Dyslipidemia  Managed with Zocor. Monitoring per PCP    Seasonal allergic rhinitis, unspecified trigger  Denies symptoms at this time.     Osteoarthritis of cervical spine, unspecified spinal osteoarthritis complication  status  No pain. Monitoring.     Acute right MCA stroke (H)  Patient appears to have no visible deficits. He is up independently, now on Plavix. Plans on discharge 8/2 if possible - discharge location and services TBD. Neuro f/u 1 week.       Past Medical History:  has no past medical history on file.    Discharge Medications:  Current Outpatient Medications   Medication Sig Dispense Refill     clopidogrel (PLAVIX) 75 MG tablet Take 75 mg by mouth daily       losartan (COZAAR) 25 MG tablet Take 25 mg by mouth daily       simvastatin (ZOCOR) 20 MG tablet Take 20 mg by mouth At Bedtime         Medication Changes/Rationale:     Resumed losartan 25 mg PO daily as PTA    Controlled medications sent with patient:   not applicable/none     ROS:   10 point ROS of systems including Constitutional, Eyes, Respiratory, Cardiovascular, Gastroenterology, Genitourinary, Integumentary, Musculoskeletal, Psychiatric were all negative except for pertinent positives noted in my HPI.    Physical Exam:   Vitals: /63   Pulse 51   Temp 96.5  F (35.8  C)   Resp 18   Wt 104.1 kg (229 lb 6.4 oz)   SpO2 96%   BMI= There is no height or weight on file to calculate BMI.  GENERAL APPEARANCE:  Alert, in no distress, pleasant, cooperative, oriented x 4  EYES:  EOM, lids, pupils and irises normal, sclera clear and conjunctiva normal, no discharge or mattering on lids or lashes noted  ENT:  Mouth normal, moist mucous membranes, nose normal without drainage or crusting, external ears without lesions, hearing acuity intact  NECK: supple, symmetrical, trachea midline  RESP:  respiratory effort normal, no chest wall tenderness, no respiratory distress, Lung sounds clear, patient is on room air  CV:  Auscultation of heart done, rate and rhythm controlled and regular, no murmur, no rub or gallop. Edema none bilateral lower extremities. VASCULAR: no open areas lower legs  ABDOMEN:  normal bowel sounds, soft, nontender, no palpable masses.  M/S:    Gait and station ambulates independently, no tenderness or swelling of the joints; able to move all extremities, digits normal  SKIN:  Inspection and palpation of skin and subcutaneous tissue: skin on extremities warm, dry and intact without rashes  NEURO: cranial nerves 2-12 grossly intact, no facial asymmetry, no speech deficits and able to follow directions, moves all extremities symmetrically  PSYCH:  insight and judgement and memory intact, affect and mood normal     SNF labs: no labs at SNF  7/24/19 WBC 7, Hgb 14.2, Plt 279, Na 137, K 4.0, Cr 0.90 and BUN 15    DISCHARGE PLAN:    Follow up labs: No labs orders/due    Medical Follow Up:      Follow up with primary care provider in 2-3 weeks  Follow up with specialist neuro as scheduled     MTM referral needed and placed by this provider: No    Current [place] scheduled appointments:   No future appointments.     Discharge Services: services as recommended with provider of choice    Discharge Instructions Verbalized to Patient at Discharge:     None      TOTAL DISCHARGE TIME:   Greater than 30 minutes  Electronically signed by:  ADITYA Hollins CNP     Home care Face to Face documentation done in [facility] attached to Home care orders for [facility] if needed.                     Sincerely,        ADITYA Hollins CNP

## 2019-08-01 NOTE — PROGRESS NOTES
[place] [facility] DISCHARGE SUMMARY  PATIENT'S NAME: Lenny Lomeli  YOB: 1960  MEDICAL RECORD NUMBER:  [id number]  Place of Service where encounter took place:  [facility] [[id number]]    PRIMARY CARE PROVIDER AND CLINIC RESPONSIBLE AFTER TRANSFER:   Physician No Ref-Primary, No address on file    Non-[facility] Provider     Transferring providers: ADITYA Hollins CNP, Kt Cunningham MD  Recent Hospitalization/ED:  [facility] in [place], [place] stay 7/24/2019 to 7/31/2019.  Date of SNF Admission: July / 31 / 2019  Date of SNF (anticipated) Discharge: August / 02 / 2019  Discharged to: home with wife  Cognitive Scores: SLUMS 25/30 and CPT 5.0/5.6, mild memory impairment  Physical Function: ambulates unlimited distance without assistace  DME: none    CODE STATUS/ADVANCE DIRECTIVES DISCUSSION:  Full Code   ALLERGIES: Aspirin    Per recent TCU provider progress notes:   59 year old male with h/o hypertension, dyslipidemia, cervical spine degenerative disc disease, and recent acute right MCA stroke on July 24, 2019 hospitalized with left sided weakness and numbness and work-up revealed diffuse restriction involving the perisylvian right frontal lobe.  CTA showed normal vascular imaging.  Treated with Plavix (allergy to ASA).  Echocardiogram with bubble study showed normal LV systolic function and no intracardiac shunt.  To TCU for therapies. Neuro f/u 1 wk. HTN - resumed losartan. Continues statin for HLD.     DISCHARGE DIAGNOSIS/NURSING FACILITY COURSE:   Benign essential hypertension  Patient is back on Cozaar and SBP range 112--145. No s/s edema.     Dyslipidemia  Managed with Zocor. Monitoring per PCP    Seasonal allergic rhinitis, unspecified trigger  Denies symptoms at this time.     Osteoarthritis of cervical spine, unspecified spinal osteoarthritis complication status  No pain. Monitoring.     Acute right MCA stroke (H)  Patient  appears to have no visible deficits. He is up independently, now on Plavix. Plans on discharge 8/2 if possible - discharge location and services TBD. Neuro f/u 1 week.       Past Medical History:  has no past medical history on file.    Discharge Medications:  Current Outpatient Medications   Medication Sig Dispense Refill     clopidogrel (PLAVIX) 75 MG tablet Take 75 mg by mouth daily       losartan (COZAAR) 25 MG tablet Take 25 mg by mouth daily       simvastatin (ZOCOR) 20 MG tablet Take 20 mg by mouth At Bedtime         Medication Changes/Rationale:     Resumed losartan 25 mg PO daily as PTA    Controlled medications sent with patient:   not applicable/none     ROS:   10 point ROS of systems including Constitutional, Eyes, Respiratory, Cardiovascular, Gastroenterology, Genitourinary, Integumentary, Musculoskeletal, Psychiatric were all negative except for pertinent positives noted in my HPI.    Physical Exam:   Vitals: /63   Pulse 51   Temp 96.5  F (35.8  C)   Resp 18   Wt 104.1 kg (229 lb 6.4 oz)   SpO2 96%   BMI= There is no height or weight on file to calculate BMI.  GENERAL APPEARANCE:  Alert, in no distress, pleasant, cooperative, oriented x 4  EYES:  EOM, lids, pupils and irises normal, sclera clear and conjunctiva normal, no discharge or mattering on lids or lashes noted  ENT:  Mouth normal, moist mucous membranes, nose normal without drainage or crusting, external ears without lesions, hearing acuity intact  NECK: supple, symmetrical, trachea midline  RESP:  respiratory effort normal, no chest wall tenderness, no respiratory distress, Lung sounds clear, patient is on room air  CV:  Auscultation of heart done, rate and rhythm controlled and regular, no murmur, no rub or gallop. Edema none bilateral lower extremities. VASCULAR: no open areas lower legs  ABDOMEN:  normal bowel sounds, soft, nontender, no palpable masses.  M/S:   Gait and station ambulates independently, no tenderness or swelling  of the joints; able to move all extremities, digits normal  SKIN:  Inspection and palpation of skin and subcutaneous tissue: skin on extremities warm, dry and intact without rashes  NEURO: cranial nerves 2-12 grossly intact, no facial asymmetry, no speech deficits and able to follow directions, moves all extremities symmetrically  PSYCH:  insight and judgement and memory intact, affect and mood normal     SNF labs: no labs at SNF  7/24/19 WBC 7, Hgb 14.2, Plt 279, Na 137, K 4.0, Cr 0.90 and BUN 15    DISCHARGE PLAN:    Follow up labs: No labs orders/due    Medical Follow Up:      Follow up with primary care provider in 2-3 weeks  Follow up with specialist neuro as scheduled     MTM referral needed and placed by this provider: No    Current [place] scheduled appointments:   No future appointments.     Discharge Services: Outpatient PT/OT with [facility]    Discharge Instructions Verbalized to Patient at Discharge:     None    TOTAL DISCHARGE TIME:   Greater than 30 minutes  Electronically signed by:  ADITYA Hollins CNP     Home care Face to Face documentation done in [facility] attached to Home care orders for [facility] if needed.

## 2019-08-02 NOTE — PATIENT INSTRUCTIONS
[facility] Discharge Orders    Name: Lenny Lomeli  : 1960  Planned Discharge Date: 19  Discharged to: with family     MEDICAL FOLLOW UP  Follow up with PCP in 1-2 weeks   Follow up with Specialists Neurology and Cardiology as recommended      FUTURE LABS: No labs orders/due    ORDER CHANGES:  None    DISCHARGE MEDICATIONS:  The patient s pharmacy is authorized to dispense a 30-day supply of medications. Refill requests should be directed to the primary provider, No Ref-Primary, Physician.   No narcotics are prescribed at time of discharge.   Current Outpatient Medications   Medication Sig Dispense Refill     clopidogrel (PLAVIX) 75 MG tablet Take 75 mg by mouth daily       losartan (COZAAR) 25 MG tablet Take 25 mg by mouth daily       simvastatin (ZOCOR) 20 MG tablet Take 20 mg by mouth At Bedtime         SERVICES:  Out patient:  Therapies with S[facility]etha [facility]    ADDITIONAL INSTRUCTIONS:  None    ADITYA Hollins CNP  This document was electronically signed on 2019

## 2019-08-06 ENCOUNTER — PATIENT OUTREACH (OUTPATIENT)
Dept: CARE COORDINATION | Facility: CLINIC | Age: 59
End: 2019-08-06

## 2019-08-06 ENCOUNTER — OFFICE VISIT (OUTPATIENT)
Dept: FAMILY MEDICINE | Facility: CLINIC | Age: 59
End: 2019-08-06
Payer: COMMERCIAL

## 2019-08-06 VITALS
HEIGHT: 70 IN | DIASTOLIC BLOOD PRESSURE: 69 MMHG | BODY MASS INDEX: 32.01 KG/M2 | TEMPERATURE: 99.3 F | SYSTOLIC BLOOD PRESSURE: 109 MMHG | HEART RATE: 74 BPM | OXYGEN SATURATION: 97 % | WEIGHT: 223.6 LBS

## 2019-08-06 DIAGNOSIS — E78.5 HYPERLIPIDEMIA LDL GOAL <70: ICD-10-CM

## 2019-08-06 DIAGNOSIS — R73.03 BORDERLINE DIABETES: ICD-10-CM

## 2019-08-06 DIAGNOSIS — Z09 HOSPITAL DISCHARGE FOLLOW-UP: Primary | ICD-10-CM

## 2019-08-06 DIAGNOSIS — I63.511 ACUTE RIGHT MCA STROKE (H): Primary | ICD-10-CM

## 2019-08-06 DIAGNOSIS — I25.10 CORONARY ARTERY CALCIFICATION OF NATIVE ARTERY: ICD-10-CM

## 2019-08-06 DIAGNOSIS — I10 ESSENTIAL HYPERTENSION: ICD-10-CM

## 2019-08-06 DIAGNOSIS — R41.3 MEMORY DIFFICULTIES: ICD-10-CM

## 2019-08-06 DIAGNOSIS — E04.1 THYROID NODULE: ICD-10-CM

## 2019-08-06 DIAGNOSIS — I25.84 CORONARY ARTERY CALCIFICATION OF NATIVE ARTERY: ICD-10-CM

## 2019-08-06 DIAGNOSIS — M50.30 DDD (DEGENERATIVE DISC DISEASE), CERVICAL: ICD-10-CM

## 2019-08-06 DIAGNOSIS — R06.83 SNORING: ICD-10-CM

## 2019-08-06 DIAGNOSIS — E66.811 CLASS 1 OBESITY WITH SERIOUS COMORBIDITY AND BODY MASS INDEX (BMI) OF 32.0 TO 32.9 IN ADULT, UNSPECIFIED OBESITY TYPE: ICD-10-CM

## 2019-08-06 DIAGNOSIS — I63.9 CEREBROVASCULAR ACCIDENT (CVA), UNSPECIFIED MECHANISM (H): ICD-10-CM

## 2019-08-06 LAB — HBA1C MFR BLD: 5.4 % (ref 0–5.6)

## 2019-08-06 PROCEDURE — 99204 OFFICE O/P NEW MOD 45 MIN: CPT | Performed by: INTERNAL MEDICINE

## 2019-08-06 PROCEDURE — 82607 VITAMIN B-12: CPT | Performed by: INTERNAL MEDICINE

## 2019-08-06 PROCEDURE — 83036 HEMOGLOBIN GLYCOSYLATED A1C: CPT | Performed by: INTERNAL MEDICINE

## 2019-08-06 PROCEDURE — 84450 TRANSFERASE (AST) (SGOT): CPT | Performed by: INTERNAL MEDICINE

## 2019-08-06 PROCEDURE — 84443 ASSAY THYROID STIM HORMONE: CPT | Performed by: INTERNAL MEDICINE

## 2019-08-06 PROCEDURE — 36415 COLL VENOUS BLD VENIPUNCTURE: CPT | Performed by: INTERNAL MEDICINE

## 2019-08-06 PROCEDURE — 80061 LIPID PANEL: CPT | Performed by: INTERNAL MEDICINE

## 2019-08-06 PROCEDURE — 99354 ZZC PROLONGED SERV,OFFICE,1ST HR: CPT | Performed by: INTERNAL MEDICINE

## 2019-08-06 PROCEDURE — 84460 ALANINE AMINO (ALT) (SGPT): CPT | Performed by: INTERNAL MEDICINE

## 2019-08-06 PROCEDURE — 80048 BASIC METABOLIC PNL TOTAL CA: CPT | Performed by: INTERNAL MEDICINE

## 2019-08-06 PROCEDURE — 82043 UR ALBUMIN QUANTITATIVE: CPT | Performed by: INTERNAL MEDICINE

## 2019-08-06 RX ORDER — UBIDECARENONE 100 MG
CAPSULE ORAL DAILY
COMMUNITY

## 2019-08-06 ASSESSMENT — ACTIVITIES OF DAILY LIVING (ADL): DEPENDENT_IADLS:: INDEPENDENT

## 2019-08-06 ASSESSMENT — MIFFLIN-ST. JEOR: SCORE: 1830.49

## 2019-08-06 NOTE — LETTER
[facility]  [address] [facility]  [address]  [place]  [address]  Tel: [phone]    August 7, 2019    Lenny Armani  [address]  [place] [address]        Dear Teri Armani,    Your diabetes test HBA1C is normal at 5.4% [normal <5.7%], Hba1c has decreased to normal range , please be reassured, please continue with life style changes, and healthy diet.      Sugar/JOSE      Enclosure: Lab Results  Results for orders placed or performed in visit on 08/06/19   Hemoglobin A1c   Result Value Ref Range    Hemoglobin A1C 5.4 0 - 5.6 %

## 2019-08-06 NOTE — PROGRESS NOTES
Clinic Care Coordination Contact    Clinic Care Coordination Contact  OUTREACH    Referral Information:       Primary Diagnosis: Neurological Disorders    Chief Complaint   Patient presents with     Clinic Care Coordination - Initial     Clinic Care Coordination - Face To Face        Universal Utilization:7/24-7/31/2019-[facility] [place] [address] 7/31-8/2/2019-Right sided MCA stroke July 24  Mild memory impairment   Clinic Utilization  Difficulty keeping appointments:: No  Compliance Concerns: No  No-Show Concerns: No  No PCP office visit in Past Year: No  Utilization    Last refreshed: 8/6/2019  3:26 PM:  Hospital Admissions 0           Last refreshed: 8/6/2019  3:26 PM:  ED Visits 0           Last refreshed: 8/6/2019  3:26 PM:  No Show Count (past year) 0              Current as of: 8/6/2019  3:26 PM              Clinical Concerns:  Current Medical Concerns:  CC met the patient and Sandy/ significant other during PCP visit today.  CC introduced self and gave business card.  Patient presents smiling and sitting on a chair and no DME noticed  Sandy  shared they have a 5 year old daughter    Pain  Pain (GOAL):: No  Health Maintenance Reviewed: Due/Overdue   Health Maintenance Due   Topic Date Due     PREVENTIVE CARE VISIT  1960     HEPATITIS C SCREENING  1960     ADVANCE CARE PLANNING  1960     COLONOSCOPY  03/05/1969     HIV SCREENING  03/05/1974     DTAP/TDAP/TD IMMUNIZATION (1 - Tdap) 03/05/1984     LIPID  03/05/1994     ZOSTER IMMUNIZATION (1 of 2) 03/05/2009     PHQ-2  01/01/2019     Clinical Pathway: None    Medication Management:  Reviewed at PCP visit      Functional Status: Independent        Living Situation:  Lives with significant other        Transportation:  Transportation concerns (GOAL):: No  Transportation means:: Family     Psychosocial:     Financial/Insurance:   Financial/Insurance concerns (GOAL):: No        Goals:   Goals         General    #1 Functional (pt-stated)     Notes - Note created  8/6/2019  3:48 PM by Katrina Garza, RN    Goal Statement: I will keep scheduled appointments with [facility] OT/PT  Measure of Success: Increased strength for daily activities   Supportive Steps to Achieve:   PCP placed a referral for [facility] today for OT/PT  Patient will make future appointments with [facility]   Barriers: recent hospitalization MCA stroke right sided   Strengths: supportive wife   Date to Achieve By: 9/6/2019  Patient expressed understanding of goal: Yes                  Plan:   Patient and Mel  plan to go and make a visit  to the [facility] in [place] today   CC will follow up in 3-5  business days to assess needs     Katrina Garza RN, Care Coordinator   [facility] -Care Coordination  [facility]   [phone]

## 2019-08-06 NOTE — PROGRESS NOTES
Subjective     Lenny Lomeli is a 59 year old male who presents to clinic today for the following health issues:    HPI       Hospital Follow-up Visit:    Hospital/Nursing Home/IP Rehab Facility: [facility]   Date of Admission: 7-  Date of Discharge: 8-2-2019  Reason(s) for Admission: Acute right MCA stroke             Problems taking medications regularly:  None       Medication changes since discharge: None       Problems adhering to non-medication therapy:  None    Summary of hospitalization:  See outside records, reviewed and scanned  Diagnostic Tests/Treatments reviewed.  Follow up needed: yes with neurology  Other Healthcare Providers Involved in Patient s Care:         Care Coordination, Specialist appointment - neurology and Physical Therapy  Update since discharge: stable.       Post Discharge Medication Reconciliation: discharge medications reconciled and changed, per note/orders (see AVS). Patient has been taking losartan//12.5 instead of losartan 25 mg as listed on discharge records.  Plan of care communicated with patient and significant other who accompanied patient     Coding guidelines for this visit:  Type of Medical   Decision Making Face-to-Face Visit       within 7 Days of discharge Face-to-Face Visit        within 14 days of discharge   Moderate Complexity [id number]   High Complexity [id number]          Executive function decline  Memory decline  Has decrease sensitivity    59 year old male with h/o hypertension, dyslipidemia, cervical spine degenerative disc disease, and recent acute right MCA stroke on July 24, 2019 hospitalized with left sided weakness and numbness and work-up revealed diffuse restriction involving the perisylvian right frontal lobe.  CTA showed normal vascular imaging.  Treated with Plavix (allergy to ASA).  Echocardiogram with bubble study showed normal LV systolic function and no intracardiac shunt.  Was transferred after to TCU for therapies,  he stayed for 3 days, underwent PT/OT. Was discharged to follow with neurology Stroke clinic and to undergo PT/OT as outpatient at [facility]. since discharge patient  main concern is cognitive impairment/deficits as per records, and some memory issues, also there has been concerns with imbalance, has been ambulating well without any support, but still with some imbalance issues. no headache, or vision change or slurred speech or focal weakness, but has some numbness still in his left side/hand. BP seem well controlled, records state he is on losartan 25 mg, yet he has been taking his home medication which is losartan//12.5 and zocor 40 mg instead of 20 mg dose.    Patient Active Problem List   Diagnosis     Thyroid nodule     Class 1 obesity with serious comorbidity and body mass index (BMI) of 32.0 to 32.9 in adult, unspecified obesity type     Borderline diabetes     Coronary artery calcification of native artery     Snoring     Essential hypertension     Memory difficulties     Cerebrovascular accident (CVA), unspecified mechanism (H)     DDD (degenerative disc disease), cervical     Cervical stenosis of spinal canal     Hyperlipidemia LDL goal <70     Cervical spondylosis without myelopathy     Past Surgical History:   Procedure Laterality Date     HERNIA REPAIR, UMBILICAL         Social History     Tobacco Use     Smoking status: Never Smoker     Smokeless tobacco: Never Used   Substance Use Topics     Alcohol use: Not Currently     Family History   Problem Relation Age of Onset     Coronary Artery Disease Father      Myocardial Infarction Brother      Hypertension Brother      Diabetes Type 2  Sister          Current Outpatient Medications   Medication Sig Dispense Refill     clopidogrel (PLAVIX) 75 MG tablet Take 75 mg by mouth daily       co-enzyme Q-10 100 MG CAPS capsule Take by mouth daily       losartan-hydrochlorothiazide (HYZAAR) 100-12.5 MG tablet Take 1 tablet by mouth  "daily 30 tablet 0     simvastatin (ZOCOR) 20 MG tablet Take 2 tablets (40 mg) by mouth At Bedtime 30 tablet 0     Allergies   Allergen Reactions     Aspirin      Recent Labs   Lab Test 08/06/19  1604   A1C 5.4   LDL 73   HDL 53   TRIG 281*   ALT 43   CR 1.05   GFRESTIMATED 77   GFRESTBLACK 89   POTASSIUM 3.9   TSH 0.86      BP Readings from Last 3 Encounters:   08/06/19 109/69   08/01/19 112/63   07/31/19 137/78    Wt Readings from Last 3 Encounters:   08/06/19 101.4 kg (223 lb 9.6 oz)   08/01/19 104.1 kg (229 lb 6.4 oz)   07/31/19 104.1 kg (229 lb 6.4 oz)      07/24/2019    CTA head / neck, middle cerebral arteries , ant cerb arteries, post cereb arteries as well ric vertebral arteries and carotid arteries are patent without focal    Stricture or aneurysm, mild atherosclerosis at right carotid bulb and at bilateral internal carotid arteries with no significant stenosis. Visualized aortic arch and great vessels off the aortic arch are unremarkable.  Mod spondylosis C5-6 w associated spinal canal and neural foraminal narrowing     5/2019 MRI of cervical spine  IMPRESSION  1. Multilevel cervical degenerations with superimposed congenitally narrow spinal canal, result in moderate C5-C6 spinal canal stenosis. Normal cervical spinal cord.  2. Severe left and moderate right C5-C6, moderate left and mild right C6-C7 neuroforaminal stenosis, can correspond to left C6 and C7 radiculopathy.        Reviewed and updated as needed this visit by Provider  Tobacco  Meds  Problems  Med Hx  Surg Hx  Fam Hx  Soc Hx        Review of Systems   ROS COMP: Constitutional, HEENT, cardiovascular, pulmonary, GI, , musculoskeletal, skin, endocrine and psych systems are negative, except as otherwise noted. Neuro as per history and present illness, sustained a left sided stroke.      Objective    /69 (BP Location: Right arm, Cuff Size: Adult Large)   Pulse 74   Temp 99.3  F (37.4  C) (Oral)   Ht 1.778 m (5' 10\")   Wt 101.4 kg " (223 lb 9.6 oz)   SpO2 97%   BMI 32.08 kg/m    Body mass index is 32.08 kg/m .  Physical Exam   GENERAL: healthy, alert and no distress  EYES: Eyes grossly normal to inspection, PERRL and conjunctivae and sclerae normal  HENT: ear canals and TM's normal, nose and mouth without ulcers or lesions  NECK: no adenopathy, no asymmetry, masses, or scars and thyroid normal to palpation  RESP: lungs clear to auscultation - no rales, rhonchi or wheezes  CV: regular rate and rhythm, normal S1 S2, no S3 or S4, no murmur, click or rub, no peripheral edema and peripheral pulses strong  ABDOMEN: soft, nontender, no hepatosplenomegaly, no masses and bowel sounds normal  MS: no gross musculoskeletal defects noted, no edema  SKIN: no suspicious lesions or rashes  NEURO: Normal strength and tone, mentation intact and speech normal, negative cerebllar signs, normal tandem walking, normal rapid hand alternating movement, normal finger to nose, no dysmetria or dysdiadokinesia.  PSYCH: mentation appears normal, affect normal    Diagnostic Test Results:  Labs reviewed in Epic        Assessment & Plan   Problem List Items Addressed This Visit        Nervous and Auditory    Cerebrovascular accident (CVA), unspecified mechanism (H)    Relevant Orders    Lipid panel reflex to direct LDL Fasting (Completed)    Zio Patch Holter Adult Pediatric Greater than 48 hrs       Respiratory    Snoring    Relevant Orders    SLEEP EVALUATION & MANAGEMENT REFERRAL - ADULT -[place] [facility] [phone]  (Age 18 and up)       Digestive    Class 1 obesity with serious comorbidity and body mass index (BMI) of 32.0 to 32.9 in adult, unspecified obesity type    Relevant Orders    BARIATRIC ADULT REFERRAL       Endocrine    Thyroid nodule    Relevant Orders    US Thyroid (Completed)    TSH with free T4 reflex (Completed)    Hyperlipidemia LDL goal <70    Relevant Medications    simvastatin (ZOCOR) 20 MG tablet       Circulatory    Coronary  artery calcification of native artery    Relevant Orders    CARDIOLOGY EVAL ADULT REFERRAL    Essential hypertension    Relevant Medications    losartan-hydrochlorothiazide (HYZAAR) 100-12.5 MG tablet    Other Relevant Orders    Albumin Random Urine Quantitative with Creat Ratio (Completed)    Basic metabolic panel  (Ca, Cl, CO2, Creat, Gluc, K, Na, BUN) (Completed)       Musculoskeletal and Integumentary    DDD (degenerative disc disease), cervical       Other    Borderline diabetes    Relevant Orders    DIABETES EDUCATOR REFERRAL    Hemoglobin A1c (Completed)    Memory difficulties    Relevant Orders    TSH with free T4 reflex (Completed)    Vitamin B12 (Completed)      Other Visit Diagnoses     Hospital discharge follow-up    -  Primary         Clinical decision making  mulltiple health problems discussed as per A/P  Follow with Neurology/stroke clinic  Keep on statin, plavix and antihypertensive meds, adequate control of cardiovascular risk factors  Will do Ziopatch Holter 14 days, to rule out PAfib.  Referral to sleep specialist , for ANJELICA  Referral to Cardiology, moderate coronary calcification [ evident on CTA head/neck, strong Fhx of CAD [dad and brother]]  Referral to weight management program  Counseled on life style changes and secondary prevention   will check US of thyroid, there is mention on CTA of head and neck, showed 1 cm thyroid nodule  Referral to care coordination; care coordinator saw patient in clinic  Advised patient to call [facility] for outpatient PT/OT as there may have been a referral from TCU, [advised patient we can also put a referral if not already done].  As for cervical spondylosis and spinal canal narrowing, prior imaging and MRI cervical spine in 5/2019 did not show any spinal cervical cord compression, patient has no radiculopathy symptoms or neck pain or limitation of ROM of neck, will continue to closely monitor and may consider referral to spine clinic for  "further evaluation,   As for the memory issues, significant other declined referral to memory clinic at this time. Will check B12 and TSH and CBC/CMP    BMI:   Estimated body mass index is 32.08 kg/m  as calculated from the following:    Height as of this encounter: 1.778 m (5' 10\").    Weight as of this encounter: 101.4 kg (223 lb 9.6 oz).   Weight management plan: Patient referred to endocrine and/or weight management specialty Discussed healthy diet and exercise guidelines        MEDICATIONS:   Orders Placed This Encounter   Medications     co-enzyme Q-10 100 MG CAPS capsule     Sig: Take by mouth daily     losartan-hydrochlorothiazide (HYZAAR) 100-12.5 MG tablet     Sig: Take 1 tablet by mouth daily     Dispense:  30 tablet     Refill:  0     simvastatin (ZOCOR) 20 MG tablet     Sig: Take 2 tablets (40 mg) by mouth At Bedtime     Dispense:  30 tablet     Refill:  0          - Resume losartan /HCTZ as you are taking 100/12.5       - stay on Plavix and statin    Work on weight loss  Regular exercise  See Patient Instructions  Return for PCP, STROKE, HTN.    Time spent face-to-face was 75 minutes from 2:45 PM till 4:00 pm including review of records, labs, tests, notes, images, exam, coordination of care,  and addressing multiple health problems as listed in Assessment Plan.    Daquan Andino MD  [facility]      "

## 2019-08-07 PROBLEM — I10 ESSENTIAL HYPERTENSION: Status: ACTIVE | Noted: 2019-08-07

## 2019-08-07 PROBLEM — I25.10 CORONARY ARTERY CALCIFICATION OF NATIVE ARTERY: Status: ACTIVE | Noted: 2019-08-07

## 2019-08-07 PROBLEM — I25.84 CORONARY ARTERY CALCIFICATION OF NATIVE ARTERY: Status: ACTIVE | Noted: 2019-08-07

## 2019-08-07 PROBLEM — R41.3 MEMORY DIFFICULTIES: Status: ACTIVE | Noted: 2019-08-07

## 2019-08-07 PROBLEM — M48.02 CERVICAL STENOSIS OF SPINAL CANAL: Status: ACTIVE | Noted: 2019-08-07

## 2019-08-07 PROBLEM — M50.30 DDD (DEGENERATIVE DISC DISEASE), CERVICAL: Status: ACTIVE | Noted: 2019-08-07

## 2019-08-07 PROBLEM — I63.9 CEREBROVASCULAR ACCIDENT (CVA), UNSPECIFIED MECHANISM (H): Status: ACTIVE | Noted: 2019-08-07

## 2019-08-07 PROBLEM — E04.1 THYROID NODULE: Status: ACTIVE | Noted: 2019-08-07

## 2019-08-07 PROBLEM — R06.83 SNORING: Status: ACTIVE | Noted: 2019-08-07

## 2019-08-07 PROBLEM — R73.03 BORDERLINE DIABETES: Status: ACTIVE | Noted: 2019-08-07

## 2019-08-07 PROBLEM — E78.5 HYPERLIPIDEMIA LDL GOAL <70: Status: ACTIVE | Noted: 2019-08-07

## 2019-08-07 PROBLEM — E66.811 CLASS 1 OBESITY WITH SERIOUS COMORBIDITY AND BODY MASS INDEX (BMI) OF 32.0 TO 32.9 IN ADULT, UNSPECIFIED OBESITY TYPE: Status: ACTIVE | Noted: 2019-08-07

## 2019-08-07 LAB
ANION GAP SERPL CALCULATED.3IONS-SCNC: 9 MMOL/L (ref 3–14)
BUN SERPL-MCNC: 16 MG/DL (ref 7–30)
CALCIUM SERPL-MCNC: 9.6 MG/DL (ref 8.5–10.1)
CHLORIDE SERPL-SCNC: 100 MMOL/L (ref 94–109)
CHOLEST SERPL-MCNC: 182 MG/DL
CO2 SERPL-SCNC: 29 MMOL/L (ref 20–32)
CREAT SERPL-MCNC: 1.05 MG/DL (ref 0.66–1.25)
CREAT UR-MCNC: 29 MG/DL
GFR SERPL CREATININE-BSD FRML MDRD: 77 ML/MIN/{1.73_M2}
GLUCOSE SERPL-MCNC: 95 MG/DL (ref 70–99)
HDLC SERPL-MCNC: 53 MG/DL
LDLC SERPL CALC-MCNC: 73 MG/DL
MICROALBUMIN UR-MCNC: <5 MG/L
MICROALBUMIN/CREAT UR: NORMAL MG/G CR (ref 0–17)
NONHDLC SERPL-MCNC: 129 MG/DL
POTASSIUM SERPL-SCNC: 3.9 MMOL/L (ref 3.4–5.3)
SODIUM SERPL-SCNC: 138 MMOL/L (ref 133–144)
TRIGL SERPL-MCNC: 281 MG/DL
TSH SERPL DL<=0.005 MIU/L-ACNC: 0.86 MU/L (ref 0.4–4)
VIT B12 SERPL-MCNC: 192 PG/ML (ref 193–986)

## 2019-08-07 RX ORDER — SIMVASTATIN 20 MG
40 TABLET ORAL AT BEDTIME
Qty: 30 TABLET | Refills: 0 | COMMUNITY
Start: 2019-08-07 | End: 2019-09-06

## 2019-08-07 RX ORDER — LOSARTAN POTASSIUM AND HYDROCHLOROTHIAZIDE 12.5; 1 MG/1; MG/1
1 TABLET ORAL DAILY
Qty: 30 TABLET | Refills: 0 | COMMUNITY
Start: 2019-08-07 | End: 2019-09-17

## 2019-08-07 ASSESSMENT — PATIENT HEALTH QUESTIONNAIRE - PHQ9: SUM OF ALL RESPONSES TO PHQ QUESTIONS 1-9: 3

## 2019-08-07 NOTE — RESULT ENCOUNTER NOTE
Please notify patient :  Lenny -  Your diabetes test HBA1C is normal at 5.4% [normal <5.7%], Hba1c has decreased to normal range , please be reassured, please continue with life style changes, and healthy diet  Sugar

## 2019-08-09 ENCOUNTER — PATIENT OUTREACH (OUTPATIENT)
Dept: CARE COORDINATION | Facility: CLINIC | Age: 59
End: 2019-08-09

## 2019-08-09 ENCOUNTER — TELEPHONE (OUTPATIENT)
Dept: FAMILY MEDICINE | Facility: CLINIC | Age: 59
End: 2019-08-09

## 2019-08-09 DIAGNOSIS — I63.9 CEREBROVASCULAR ACCIDENT (CVA), UNSPECIFIED MECHANISM (H): Primary | ICD-10-CM

## 2019-08-09 DIAGNOSIS — E78.5 HYPERLIPIDEMIA LDL GOAL <70: Primary | ICD-10-CM

## 2019-08-09 DIAGNOSIS — Z86.73 HISTORY OF RIGHT MCA STROKE: Primary | ICD-10-CM

## 2019-08-09 DIAGNOSIS — R41.89 COGNITIVE CHANGES: ICD-10-CM

## 2019-08-09 ASSESSMENT — ACTIVITIES OF DAILY LIVING (ADL): DEPENDENT_IADLS:: INDEPENDENT

## 2019-08-09 NOTE — TELEPHONE ENCOUNTER
To PCP:     Pended the External Referrals for Speech, PT and OT.     Please review/sign.     Thank you!

## 2019-08-09 NOTE — PROGRESS NOTES
Order was signed as External referral.   Referral to [facility] for outpatient PT/OT

## 2019-08-09 NOTE — RESULT ENCOUNTER NOTE
Please notify patient of lab results  Lenny-  Your labs show elevated Triglyceride,at 281, goal is less than 150, would you consider starting on low dose fenofibrate that help lower TG?, but also you can watch your diet and take fish oil 3 omega fatty acid, 1-2 capsules daily, if you agree with fenofibrate will be low dose but will monitor for side effects such as muscle toxicity, muscle pain and will monitor your liver enzymes  your urine micro-albumin was normal, which is reassuring [no spilling of protein in urine]  Your kidney and electrolytes are normal  Dr Andino

## 2019-08-09 NOTE — LETTER
[place] [address] [facility]  [address]  [place]  [address]  Tel: [phone]    August 12, 2019    Lenny Lomeli  [address] [place] [address]        Lenny-   Your liver enzymes test were added to labs and are normal.   Resulted Orders   ALT   Result Value Ref Range    ALT 43 0 - 70 U/L   AST   Result Value Ref Range    AST 20 0 - 45 U/L       If you have any further questions or problems, please contact our office.      Sincerely,    Daquan Andino MD /maryellen      Resulted Orders   ALT   Result Value Ref Range    ALT 43 0 - 70 U/L   AST   Result Value Ref Range    AST 20 0 - 45 U/L

## 2019-08-09 NOTE — TELEPHONE ENCOUNTER
I have already put the orders for external PT/OT.. Please follow with [facility] to make sure they have received.  Thank you

## 2019-08-09 NOTE — TELEPHONE ENCOUNTER
Reason for Call: Request for an order or referral: Order for Sangeetha Al     Order or referral being requested: Order for Sangeetha Al     Date needed: as soon as possible    Has the patient been seen by the PCP for this problem? Yes     Additional comments: Demetra Al called requesting orders for Physical Therapy, Occupational Therapy, and Speech. Orders must include pt Diagnosis.  Fax number [phone] Sangeetha Al     Phone number Patient can be reached at:  Home number on file [phone] (home)    Best Time:  Anytime     Can we leave a detailed message on this number?  YES    Call taken on 8/9/2019 at 10:08 AM by Shabbir Kemp

## 2019-08-09 NOTE — LETTER
[place] CARE COORDINATION  [address]  [place] [address]    September 5, 2019    Lenny Lomeli  [address] [place] [address]      Dear Lenny,    I have been unsuccessful in reaching you since our last contact. At this time I will make no further attempts to reach you, however this does not change your ability to continue receiving care from your providers at [facility]. If you are needing additional support from a care coordinator in the future please contact me at [phone].    All of us at [facility] are invested in your health and are here to assist you in meeting your goals.    Sincerely,    Katrina Garza RN

## 2019-08-09 NOTE — TELEPHONE ENCOUNTER
External Referral was put for PT/OT, please follow on order to make sure order is appropriately assigned to [facility] for outpatient PT/OT

## 2019-08-10 LAB
ALT SERPL W P-5'-P-CCNC: 43 U/L (ref 0–70)
AST SERPL W P-5'-P-CCNC: 20 U/L (ref 0–45)

## 2019-08-12 ENCOUNTER — HOSPITAL ENCOUNTER (OUTPATIENT)
Dept: ULTRASOUND IMAGING | Facility: CLINIC | Age: 59
Discharge: HOME | End: 2019-08-12
Attending: INTERNAL MEDICINE | Admitting: INTERNAL MEDICINE
Payer: MEDICAID

## 2019-08-12 ENCOUNTER — TELEPHONE (OUTPATIENT)
Dept: FAMILY MEDICINE | Facility: CLINIC | Age: 59
End: 2019-08-12

## 2019-08-12 DIAGNOSIS — E04.1 THYROID NODULE: ICD-10-CM

## 2019-08-12 PROCEDURE — 76536 US EXAM OF HEAD AND NECK: CPT

## 2019-08-12 NOTE — LETTER
[address]  [place] [address]  [phone]                                                                                               Date: 8/12/2019    Lenny Lomeli                                                                               [address] [place] [address]              Lenny-       Your labs show elevated Triglyceride,at 281, goal is less than 150.  Would you consider starting on low dose fenofibrate that helps to lower Triglycerides?  Also, you can watch your diet and take fish oil 3 omega fatty acid, 1-2 capsules daily to help lower Triclygerides.  If you agree with fenofibrate, it will be a low dose and you will need to monitor for side effects such as muscle toxicity and muscle pain.  And we will monitor your liver enzymes.    Your urine micro-albumin was normal, which is reassuring [no spilling of protein in urine].  Your kidney and electrolytes are normal           Sincerely,    Dr Andino

## 2019-08-12 NOTE — TELEPHONE ENCOUNTER
Patient returned call.     Shared all info from PCP with patient.   Printed and mailed to patient lab results and letter with results and recommendations.     Patient stated understanding and agreement with advise/plan.      Patient will consider starting a Fenofibrate and notify clinic if he decides to proceed with Rx.     Faiza AGUIRRE RN,BSN

## 2019-08-12 NOTE — TELEPHONE ENCOUNTER
Please see Dr Andino's labs result notes  Left message to call triage back.    Please notify patient of lab results   Lenny-   Your labs show elevated Triglyceride,at 281, goal is less than 150, would you consider starting on low dose fenofibrate that help lower TG?, but also you can watch your diet and take fish oil 3 omega fatty acid, 1-2 capsules daily, if you agree with fenofibrate will be low dose but will monitor for side effects such as muscle toxicity, muscle pain and will monitor your liver enzymes   your urine micro-albumin was normal, which is reassuring [no spilling of protein in urine]   Your kidney and electrolytes are normal   Dr Thu Cabral RN- Triage [employer]

## 2019-08-13 ENCOUNTER — HOSPITAL ENCOUNTER (OUTPATIENT)
Dept: CARDIOLOGY | Facility: CLINIC | Age: 59
Discharge: HOME | End: 2019-08-13
Attending: INTERNAL MEDICINE | Admitting: INTERNAL MEDICINE
Payer: MEDICAID

## 2019-08-13 DIAGNOSIS — I63.9 CEREBROVASCULAR ACCIDENT (CVA), UNSPECIFIED MECHANISM (H): ICD-10-CM

## 2019-08-13 PROCEDURE — 0298T ZIO PATCH HOLTER ADULT PEDIATRIC GREATER THAN 48 HRS: CPT | Performed by: INTERNAL MEDICINE

## 2019-08-13 PROCEDURE — 0296T ZIO PATCH HOLTER ADULT PEDIATRIC GREATER THAN 48 HRS: CPT

## 2019-08-13 NOTE — RESULT ENCOUNTER NOTE
Sushila Galvez,    This is to inform you regarding your test result.     I am covering for Dr. Andino while he is out of the office  Your thyroid ultrasound shows single nodule which is indeterminate.  Mostly thyroid nodules are benign but still there is slight risk so follow up is important  You should have repeat ultrasound in 6 months to make sure nodule is not growing.  Please follow up with your primary provider to discuss this further      Sincerely,      Dr.Nasima Veena MD,FACP

## 2019-08-16 ENCOUNTER — TELEPHONE (OUTPATIENT)
Dept: FAMILY MEDICINE | Facility: CLINIC | Age: 59
End: 2019-08-16

## 2019-08-16 DIAGNOSIS — M50.30 DDD (DEGENERATIVE DISC DISEASE), CERVICAL: Primary | ICD-10-CM

## 2019-08-16 DIAGNOSIS — M47.812 CERVICAL SPONDYLOSIS WITHOUT MYELOPATHY: ICD-10-CM

## 2019-08-16 DIAGNOSIS — M48.02 CERVICAL STENOSIS OF SPINAL CANAL: ICD-10-CM

## 2019-08-16 NOTE — TELEPHONE ENCOUNTER
Please notify patient of following     Lenny-    I recommend you see neurosurgery for DDD and cervical spinal canal stenosis/narrowing seen on MRI on 5/2019 as was discussed of finding on MRI  in clinic    A referral is already put to [facility] - [facility] Serrano [phone]   [email or web address]    Please you can call to schedule at [phone]    If you would like referral to [facility] Dept of Neurosurgery,  We can put another referral.    DR Andino

## 2019-08-16 NOTE — TELEPHONE ENCOUNTER
"Patient goes by \"Jose\".    Left message to call back. Advised that this is not urgent; can wait until next week.    1)  Please relay recommendation below re: referral for neurosurgery      2)  ALSO, please ask if he read myChart result for Triglycerides and PCP recommendation to start Fenofibrate:.(confirm pharmacy)    Notes recorded by Daquan Andino MD on 8/9/2019 at 4:06 PM CDT  Please notify patient of lab results  Lenny-  Your labs show elevated Triglyceride,at 281, goal is less than 150, would you consider starting on low dose fenofibrate that help lower TG?, but also you can watch your diet and take fish oil 3 omega fatty acid, 1-2 capsules daily, if you agree with fenofibrate will be low dose but will monitor for side effects such as muscle toxicity, muscle pain and will monitor your liver enzymes  your urine micro-albumin was normal, which is reassuring [no spilling of protein in urine]  Your kidney and electrolytes are normal  Dr Thu Stroud RN on 8/16/2019 at 3:58 PM    "

## 2019-08-16 NOTE — TELEPHONE ENCOUNTER
Spoke to patient.  He was already aware of the Fenofibrate recommendation and will consider this.  For now, plans to make dietary changes.    Phone no.given for the neurosurgery referral.  He saw neurology clinic yesterday.    Nothing further at this time.  Mary Alice Stroud RN on 8/16/2019 at 4:08 PM

## 2019-08-21 ENCOUNTER — TRANSFERRED RECORDS (OUTPATIENT)
Dept: HEALTH INFORMATION MANAGEMENT | Facility: CLINIC | Age: 59
End: 2019-08-21

## 2019-08-25 ENCOUNTER — MYC MEDICAL ADVICE (OUTPATIENT)
Dept: FAMILY MEDICINE | Facility: CLINIC | Age: 59
End: 2019-08-25

## 2019-08-25 DIAGNOSIS — I63.9 CEREBROVASCULAR ACCIDENT (CVA), UNSPECIFIED MECHANISM (H): Primary | ICD-10-CM

## 2019-08-26 ENCOUNTER — TELEPHONE (OUTPATIENT)
Dept: FAMILY MEDICINE | Facility: CLINIC | Age: 59
End: 2019-08-26

## 2019-08-26 RX ORDER — CLOPIDOGREL BISULFATE 75 MG/1
75 TABLET ORAL DAILY
Qty: 30 TABLET | Refills: 0 | Status: SHIPPED | OUTPATIENT
Start: 2019-08-26 | End: 2019-09-25

## 2019-08-26 NOTE — TELEPHONE ENCOUNTER
Left message for patient that a refill for plavix has been sent to [facility].  Rowena Ashley MA    Message from ADITYA Murray CNP sent at 8/26/2019 12:21 PM CDT

## 2019-08-26 NOTE — TELEPHONE ENCOUNTER
Patient needing refill of Clopidogrel (Plavix) 75 mg. Medication is listed as historical and has allergy to Aspirin. Please advise.    Arnie Mckeon CMA on 8/26/2019 at 11:09 AM

## 2019-08-26 NOTE — TELEPHONE ENCOUNTER
Pt is calling to follow up on this.  He is anxious about getting this filled. Please call him when it is sent to the pharmacy.  [phone] it is ok to leave a message  [facility] [place], [place] - [address]

## 2019-08-26 NOTE — TELEPHONE ENCOUNTER
----- Message from ADITYA Murray CNP sent at 8/26/2019 12:21 PM CDT -----  Please let MR Lomeli know htat refill for plavix has been sent to [facility]

## 2019-09-05 ENCOUNTER — TRANSFERRED RECORDS (OUTPATIENT)
Dept: HEALTH INFORMATION MANAGEMENT | Facility: CLINIC | Age: 59
End: 2019-09-05

## 2019-09-06 ENCOUNTER — PATIENT OUTREACH (OUTPATIENT)
Dept: CARE COORDINATION | Facility: CLINIC | Age: 59
End: 2019-09-06

## 2019-09-06 ENCOUNTER — OFFICE VISIT (OUTPATIENT)
Dept: FAMILY MEDICINE | Facility: CLINIC | Age: 59
End: 2019-09-06
Payer: MEDICAID

## 2019-09-06 VITALS
HEIGHT: 70 IN | SYSTOLIC BLOOD PRESSURE: 109 MMHG | HEART RATE: 66 BPM | DIASTOLIC BLOOD PRESSURE: 71 MMHG | OXYGEN SATURATION: 98 % | TEMPERATURE: 98.1 F | BODY MASS INDEX: 28.2 KG/M2 | WEIGHT: 197 LBS

## 2019-09-06 DIAGNOSIS — E04.1 THYROID NODULE: ICD-10-CM

## 2019-09-06 DIAGNOSIS — I63.9 CEREBROVASCULAR ACCIDENT (CVA), UNSPECIFIED MECHANISM (H): Primary | ICD-10-CM

## 2019-09-06 DIAGNOSIS — I63.9 CEREBROVASCULAR ACCIDENT (CVA), UNSPECIFIED MECHANISM (H): ICD-10-CM

## 2019-09-06 DIAGNOSIS — R06.83 SNORING: ICD-10-CM

## 2019-09-06 DIAGNOSIS — E78.5 HYPERLIPIDEMIA LDL GOAL <70: Primary | ICD-10-CM

## 2019-09-06 DIAGNOSIS — E66.811 CLASS 1 OBESITY WITH SERIOUS COMORBIDITY AND BODY MASS INDEX (BMI) OF 32.0 TO 32.9 IN ADULT, UNSPECIFIED OBESITY TYPE: ICD-10-CM

## 2019-09-06 DIAGNOSIS — I25.10 CORONARY ARTERY CALCIFICATION OF NATIVE ARTERY: ICD-10-CM

## 2019-09-06 DIAGNOSIS — I25.84 CORONARY ARTERY CALCIFICATION OF NATIVE ARTERY: ICD-10-CM

## 2019-09-06 DIAGNOSIS — I10 ESSENTIAL HYPERTENSION: ICD-10-CM

## 2019-09-06 LAB
ANION GAP SERPL CALCULATED.3IONS-SCNC: 7 MMOL/L (ref 3–14)
BUN SERPL-MCNC: 16 MG/DL (ref 7–30)
CALCIUM SERPL-MCNC: 9.4 MG/DL (ref 8.5–10.1)
CHLORIDE SERPL-SCNC: 102 MMOL/L (ref 94–109)
CO2 SERPL-SCNC: 27 MMOL/L (ref 20–32)
CREAT SERPL-MCNC: 0.96 MG/DL (ref 0.66–1.25)
GFR SERPL CREATININE-BSD FRML MDRD: 85 ML/MIN/{1.73_M2}
GLUCOSE SERPL-MCNC: 93 MG/DL (ref 70–99)
POTASSIUM SERPL-SCNC: 3.6 MMOL/L (ref 3.4–5.3)
SODIUM SERPL-SCNC: 136 MMOL/L (ref 133–144)

## 2019-09-06 PROCEDURE — 80048 BASIC METABOLIC PNL TOTAL CA: CPT | Performed by: INTERNAL MEDICINE

## 2019-09-06 PROCEDURE — 99214 OFFICE O/P EST MOD 30 MIN: CPT | Performed by: INTERNAL MEDICINE

## 2019-09-06 PROCEDURE — 36415 COLL VENOUS BLD VENIPUNCTURE: CPT | Performed by: INTERNAL MEDICINE

## 2019-09-06 RX ORDER — LOSARTAN POTASSIUM AND HYDROCHLOROTHIAZIDE 12.5; 1 MG/1; MG/1
1 TABLET ORAL DAILY
Qty: 90 TABLET | Refills: 0 | Status: CANCELLED | OUTPATIENT
Start: 2019-09-06

## 2019-09-06 RX ORDER — LOSARTAN POTASSIUM AND HYDROCHLOROTHIAZIDE 12.5; 5 MG/1; MG/1
1 TABLET ORAL DAILY
Qty: 90 TABLET | Refills: 0 | Status: SHIPPED | OUTPATIENT
Start: 2019-09-06 | End: 2019-10-04

## 2019-09-06 RX ORDER — SIMVASTATIN 40 MG
40 TABLET ORAL AT BEDTIME
Qty: 90 TABLET | Refills: 1 | Status: SHIPPED | OUTPATIENT
Start: 2019-09-06 | End: 2019-09-18

## 2019-09-06 ASSESSMENT — ACTIVITIES OF DAILY LIVING (ADL): DEPENDENT_IADLS:: INDEPENDENT

## 2019-09-06 ASSESSMENT — MIFFLIN-ST. JEOR: SCORE: 1709.84

## 2019-09-06 NOTE — PROGRESS NOTES
"Subjective     Lenny Lomeli is a 59 year old male who presents to clinic today for the following health issues:    HPI   Medication Followup of losartan    Taking Medication as prescribed: yes    Side Effects:  dizziness    Medication Helping Symptoms:  yes     Patient is presenting for follow-up from a history of stroke, he is undergoing PT OT was signed off PT doing much better, also had some cognitive testing he is doing better as far as cognition there is no concerns from the patient as for his memory deficits; describes when he gets really tired and fatigued he feels like he \"slows down little bit\".  He describes a couple episodes of dizzy spells and that at one point he felt near syncopal.  He has been physically very active, he does a lot of walking in the neighborhood on a daily basis.  Denies any slurred speech headache focal weakness or numbness, denies any chest pain or palpitations.  He had seen urology , no changes in medications he was told to take \"valerian root\" for his anxiety he had anxiety all his life but has been recently exacerbated when the incident happened he stopped all caffeinated products, he wakes at night anxious, he is having to deal with the stress of the stroke and this appointment made him more anxious, he has said sometimes he gets startling responses, he gets angry easily agitated easily as he describes.  Denies being depressed, he has undergone sleep study and he would need to use a CPAP.      Patient Active Problem List   Diagnosis     Thyroid nodule     Class 1 obesity with serious comorbidity and body mass index (BMI) of 32.0 to 32.9 in adult, unspecified obesity type     Borderline diabetes     Coronary artery calcification of native artery     Snoring     Essential hypertension     Memory difficulties     Cerebrovascular accident (CVA), unspecified mechanism (H)     DDD (degenerative disc disease), cervical     Cervical stenosis of spinal canal     Hyperlipidemia LDL goal " <70     Cervical spondylosis without myelopathy     Past Surgical History:   Procedure Laterality Date     HERNIA REPAIR, UMBILICAL         Social History     Tobacco Use     Smoking status: Never Smoker     Smokeless tobacco: Never Used   Substance Use Topics     Alcohol use: Not Currently     Family History   Problem Relation Age of Onset     Coronary Artery Disease Father      Myocardial Infarction Brother      Hypertension Brother      Diabetes Type 2  Sister          Current Outpatient Medications   Medication Sig Dispense Refill     clopidogrel (PLAVIX) 75 MG tablet Take 1 tablet (75 mg) by mouth daily 30 tablet 0     co-enzyme Q-10 100 MG CAPS capsule Take by mouth daily       losartan-hydrochlorothiazide (HYZAAR) 100-12.5 MG tablet Take 1 tablet by mouth daily 30 tablet 0     losartan-hydrochlorothiazide (HYZAAR) 50-12.5 MG tablet Take 1 tablet by mouth daily 90 tablet 0     simvastatin (ZOCOR) 40 MG tablet Take 1 tablet (40 mg) by mouth At Bedtime 90 tablet 1     Allergies   Allergen Reactions     Aspirin      Recent Labs   Lab Test 09/06/19  1124 08/06/19  1604 07/25/19 07/24/19   A1C  --  5.4  --   --    LDL  --  73 42  --    HDL  --  53 54  --    TRIG  --  281* 149  --    ALT  --  43 37 36   CR 0.96 1.05 0.90 1.00   GFRESTIMATED 85 77  --  >60   GFRESTBLACK >90 89  --   --    POTASSIUM 3.6 3.9  --  3.6   TSH  --  0.86  --   --       BP Readings from Last 3 Encounters:   09/06/19 109/71   08/06/19 109/69   08/01/19 112/63    Wt Readings from Last 3 Encounters:   09/06/19 89.4 kg (197 lb)   08/06/19 101.4 kg (223 lb 9.6 oz)   08/01/19 104.1 kg (229 lb 6.4 oz)                    Reviewed and updated as needed this visit by Provider  Tobacco  Allergies  Meds  Med Hx  Surg Hx  Fam Hx  Soc Hx        Review of Systems   ROS COMP: Constitutional, HEENT, cardiovascular, pulmonary, gi and gu systems are negative, except as otherwise noted.      Objective    /71 (BP Location: Right arm, Patient  "Position: Sitting, Cuff Size: Adult Large)   Pulse 66   Temp 98.1  F (36.7  C) (Oral)   Ht 1.778 m (5' 10\")   Wt 89.4 kg (197 lb)   SpO2 98%   BMI 28.27 kg/m    Body mass index is 28.27 kg/m .  Physical Exam   GENERAL: healthy, alert and no distress  EYES: Eyes grossly normal to inspection, PERRL and conjunctivae and sclerae normal  HENT: ear canals and TM's normal, nose and mouth without ulcers or lesions  NECK: no adenopathy, no asymmetry, masses, or scars and thyroid normal to palpation  RESP: lungs clear to auscultation - no rales, rhonchi or wheezes  CV: regular rate and rhythm, normal S1 S2, no S3 or S4, no murmur, click or rub, no peripheral edema and peripheral pulses strong  ABDOMEN: soft, nontender, no hepatosplenomegaly, no masses and bowel sounds normal  MS: no gross musculoskeletal defects noted, no edema  SKIN: no suspicious lesions or rashes  NEURO: Normal strength and tone, mentation intact and speech normal.  Negative cerebellar signs intact cranial nerves II to XII is negative Romberg normal rapid hand alternating and finger-to-nose exam.  PSYCH: mentation appears normal, affect normal/bright    Diagnostic Test Results:  Labs reviewed in Epic        Assessment & Plan   Problem List Items Addressed This Visit        Nervous and Auditory    Cerebrovascular accident (CVA), unspecified mechanism (H)       Respiratory    Snoring       Digestive    Class 1 obesity with serious comorbidity and body mass index (BMI) of 32.0 to 32.9 in adult, unspecified obesity type       Endocrine    Thyroid nodule    Hyperlipidemia LDL goal <70 - Primary    Relevant Medications    simvastatin (ZOCOR) 40 MG tablet       Circulatory    Coronary artery calcification of native artery    Essential hypertension    Relevant Medications    losartan-hydrochlorothiazide (HYZAAR) 50-12.5 MG tablet    Other Relevant Orders    **Basic metabolic panel FUTURE anytime (Completed)         Clinical decision-making advised I have no " experience or evidence-based medicine to support use of Valerian Root use, we did some literature review, he may start on a low-dose 50 mg 3 times daily; can consult with a neurologist again about the dose he should be on and whether it interacts with Plavix and Zocor; again there is no studies to support that, advised if he gets excessive nosebleeds or blood in the stools or hematochezia or any petechia will need to stop medication immediately, other options to put him on low-dose of SSRI  Again stressed on importance of use of CPAP to get a good sleep at night so that he is not fatigued during the day and he is more active and this would improve his cognition and his blood pressure; he has lost some 20 pounds and congratulated him on that, continue with lifestyle changes, healthy diet low salt intake.  Blood pressure seems well controlled although on the low side will cut down the dose of the losartan/hydrochlorothiazide to 50/12.5 he will need to call us with blood pressure readings and also refill given, we will repeat a basic panel today, advised him on preventive and precautionary management of presyncopal episode to sit and lift his legs up. Advised to keep well-hydrated all times.  follow-up in 4 weeks to discuss his blood pressure and dizzy spells as well as anxiety issues.  We discussed the Holter results showed rare PACs and PVCs there was minimal heart rate of 38 at 3 AM in the morning.  He is seeing cardiology on September 18 as well.  His ultrasound of thyroid showed a 1 x 1 x 0.6 cm thyroid nodule in the isthmus that is hypoechoic, will discuss with  Endocrinology, I would recommend repeating the ultrasound in 6 months to make sure the nodule is stable his TSH has been normal.    ADDENDUM: discussed thyroid nodule with endocrinology Dr Zafar and he concurred with repeating US of thyroid in 1 year.    BMI:   Estimated body mass index is 28.27 kg/m  as calculated from the following:    Height as of  "this encounter: 1.778 m (5' 10\").    Weight as of this encounter: 89.4 kg (197 lb).   Weight management plan: Discussed healthy diet and exercise guidelines        Work on weight loss  Regular exercise  See Patient Instructions  Return in about 4 weeks (around 10/4/2019) for BP Recheck, anxiety, CVA.    Daquan Andino MD  [facility]    "

## 2019-09-06 NOTE — PROGRESS NOTES
Clinic Care Coordination Contact    Follow Up Progress Note      Assessment: CC met the patient face to face after PCP visit .  Patient is ambulating independently and smiling . Patient reports he has 2 more weeks at [facility] and then he is going back to work     Goals addressed this encounter:   Goals Addressed                 This Visit's Progress      COMPLETED: #1 Functional (pt-stated)        Goal Statement: I will keep scheduled appointments with [facility] OT/PT  Measure of Success: Increased strength for daily activities   Supportive Steps to Achieve:   PCP placed a referral for [facility] today for OT/PT  Patient will make future appointments with [facility]   Barriers: recent hospitalization MCA stroke right sided   Strengths: supportive wife   Date to Achieve By: 9/6/2019  Patient expressed understanding of goal: Yes               Intervention/Education provided during outreach: CC gave business cared for any future questions or concerns     Outreach Frequency: 2 weeks    Plan:   No unmet needs, no further care coordination needed at this time     Katrina Garza RN, Care Coordinator   [facility] -Care Coordination  [facility] , [facility]   [phone]

## 2019-09-17 NOTE — PROGRESS NOTES
HPI and Plan:           CURRENT MEDICATIONS:  Current Outpatient Medications   Medication Sig Dispense Refill     clopidogrel (PLAVIX) 75 MG tablet Take 1 tablet (75 mg) by mouth daily 30 tablet 0     co-enzyme Q-10 100 MG CAPS capsule Take by mouth daily       losartan-hydrochlorothiazide (HYZAAR) 50-12.5 MG tablet Take 1 tablet by mouth daily 90 tablet 0     simvastatin (ZOCOR) 40 MG tablet Take 1 tablet (40 mg) by mouth At Bedtime 90 tablet 1       ALLERGIES     Allergies   Allergen Reactions     Aspirin        PAST MEDICAL HISTORY:  Past Medical History:   Diagnosis Date     Allergic rhinitis      Borderline diabetes 8/7/2019     Cerebrovascular accident (CVA), unspecified mechanism (H) 8/7/2019     Coronary artery calcification of native artery 8/7/2019     Essential hypertension 8/7/2019     HTN (hypertension)      Hyperlipidemia      Thyroid nodule 8/7/2019     Urticaria        PAST SURGICAL HISTORY:  Past Surgical History:   Procedure Laterality Date     HERNIA REPAIR, UMBILICAL         FAMILY HISTORY:  Family History   Problem Relation Age of Onset     Coronary Artery Disease Father      Myocardial Infarction Brother      Hypertension Brother      Diabetes Type 2  Sister        SOCIAL HISTORY:  Social History     Socioeconomic History     Marital status:      Spouse name: Not on file     Number of children: Not on file     Years of education: Not on file     Highest education level: Not on file   Occupational History     Not on file   Social Needs     Financial resource strain: Not on file     Food insecurity:     Worry: Not on file     Inability: Not on file     Transportation needs:     Medical: Not on file     Non-medical: Not on file   Tobacco Use     Smoking status: Never Smoker     Smokeless tobacco: Never Used   Substance and Sexual Activity     Alcohol use: Not Currently     Drug use: Not Currently     Sexual activity: Not Currently   Lifestyle     Physical activity:     Days per week: Not on  file     Minutes per session: Not on file     Stress: Not on file   Relationships     Social connections:     Talks on phone: Not on file     Gets together: Not on file     Attends Voodoo service: Not on file     Active member of club or organization: Not on file     Attends meetings of clubs or organizations: Not on file     Relationship status: Not on file     Intimate partner violence:     Fear of current or ex partner: Not on file     Emotionally abused: Not on file     Physically abused: Not on file     Forced sexual activity: Not on file   Other Topics Concern     Not on file   Social History Narrative     Not on file       Review of Systems:  Skin:          Eyes:         ENT:         Respiratory:          Cardiovascular:         Gastroenterology:        Genitourinary:         Musculoskeletal:         Neurologic:         Psychiatric:         Heme/Lymph/Imm:         Endocrine:           Physical Exam:  Vitals: There were no vitals taken for this visit.    Constitutional:  cooperative, alert and oriented, well developed, well nourished, in no acute distress        Skin:  warm and dry to the touch, no apparent skin lesions or masses noted          Head:  normocephalic, no masses or lesions        Eyes:  pupils equal and round, conjunctivae and lids unremarkable, sclera white, no xanthalasma, EOMS intact, no nystagmus        Lymph:No Cervical lymphadenopathy present     ENT:  no pallor or cyanosis, dentition good        Neck:  carotid pulses are full and equal bilaterally, JVP normal, no carotid bruit        Respiratory:  normal breath sounds, clear to auscultation, normal A-P diameter, normal symmetry, normal respiratory excursion, no use of accessory muscles         Cardiac: regular rhythm, normal S1/S2, no S3 or S4, apical impulse not displaced, no murmurs, gallops or rubs                pulses full and equal, no bruits auscultated                                        GI:  abdomen soft, non-tender, BS  normoactive, no mass, no HSM, no bruits        Extremities and Muscular Skeletal:  no deformities, clubbing, cyanosis, erythema observed              Neurological:  no gross motor deficits        Psych:  Alert and Oriented x 3        CC  Daquan Andino MD  [address]  [place] [address]

## 2019-09-18 ENCOUNTER — OFFICE VISIT (OUTPATIENT)
Dept: CARDIOLOGY | Facility: CLINIC | Age: 59
End: 2019-09-18
Attending: INTERNAL MEDICINE
Payer: MEDICAID

## 2019-09-18 VITALS
SYSTOLIC BLOOD PRESSURE: 120 MMHG | HEART RATE: 63 BPM | DIASTOLIC BLOOD PRESSURE: 77 MMHG | BODY MASS INDEX: 27.49 KG/M2 | WEIGHT: 192 LBS | HEIGHT: 70 IN

## 2019-09-18 DIAGNOSIS — I25.84 CORONARY ARTERY CALCIFICATION OF NATIVE ARTERY: ICD-10-CM

## 2019-09-18 DIAGNOSIS — I25.10 CORONARY ARTERY CALCIFICATION OF NATIVE ARTERY: ICD-10-CM

## 2019-09-18 PROCEDURE — 99204 OFFICE O/P NEW MOD 45 MIN: CPT | Performed by: INTERNAL MEDICINE

## 2019-09-18 RX ORDER — ROSUVASTATIN CALCIUM 20 MG/1
20 TABLET, COATED ORAL DAILY
Qty: 90 TABLET | Refills: 3 | Status: SHIPPED | OUTPATIENT
Start: 2019-09-18

## 2019-09-18 ASSESSMENT — MIFFLIN-ST. JEOR: SCORE: 1687.16

## 2019-09-18 NOTE — LETTER
9/18/2019    Daquan Andino MD  [address] [facility] [address]  [facility] [address]    RE: Lenny Lomeli       Dear Colleague,    I had the pleasure of seeing Lenny Lomeli in the [facility].    HPI and Plan:           CURRENT MEDICATIONS:  Current Outpatient Medications   Medication Sig Dispense Refill     clopidogrel (PLAVIX) 75 MG tablet Take 1 tablet (75 mg) by mouth daily 30 tablet 0     co-enzyme Q-10 100 MG CAPS capsule Take by mouth daily       losartan-hydrochlorothiazide (HYZAAR) 50-12.5 MG tablet Take 1 tablet by mouth daily 90 tablet 0     simvastatin (ZOCOR) 40 MG tablet Take 1 tablet (40 mg) by mouth At Bedtime 90 tablet 1       ALLERGIES     Allergies   Allergen Reactions     Aspirin        PAST MEDICAL HISTORY:  Past Medical History:   Diagnosis Date     Allergic rhinitis      Borderline diabetes 8/7/2019     Cerebrovascular accident (CVA), unspecified mechanism (H) 8/7/2019     Coronary artery calcification of native artery 8/7/2019     Essential hypertension 8/7/2019     HTN (hypertension)      Hyperlipidemia      Thyroid nodule 8/7/2019     Urticaria        PAST SURGICAL HISTORY:  Past Surgical History:   Procedure Laterality Date     HERNIA REPAIR, UMBILICAL         FAMILY HISTORY:  Family History   Problem Relation Age of Onset     Coronary Artery Disease Father      Myocardial Infarction Brother      Hypertension Brother      Diabetes Type 2  Sister        SOCIAL HISTORY:  Social History     Socioeconomic History     Marital status:      Spouse name: Not on file     Number of children: Not on file     Years of education: Not on file     Highest education level: Not on file   Occupational History     Not on file   Social Needs     Financial resource strain: Not on file     Food insecurity:     Worry: Not on file     Inability: Not on file     Transportation needs:     Medical: Not on file     Non-medical: Not on file   Tobacco Use     Smoking status: Never Smoker      Smokeless tobacco: Never Used   Substance and Sexual Activity     Alcohol use: Not Currently     Drug use: Not Currently     Sexual activity: Not Currently   Lifestyle     Physical activity:     Days per week: Not on file     Minutes per session: Not on file     Stress: Not on file   Relationships     Social connections:     Talks on phone: Not on file     Gets together: Not on file     Attends Jewish service: Not on file     Active member of club or organization: Not on file     Attends meetings of clubs or organizations: Not on file     Relationship status: Not on file     Intimate partner violence:     Fear of current or ex partner: Not on file     Emotionally abused: Not on file     Physically abused: Not on file     Forced sexual activity: Not on file   Other Topics Concern     Not on file   Social History Narrative     Not on file       Review of Systems:  Skin:          Eyes:         ENT:         Respiratory:          Cardiovascular:         Gastroenterology:        Genitourinary:         Musculoskeletal:         Neurologic:         Psychiatric:         Heme/Lymph/Imm:         Endocrine:           Physical Exam:  Vitals: There were no vitals taken for this visit.    Constitutional:  cooperative, alert and oriented, well developed, well nourished, in no acute distress        Skin:  warm and dry to the touch, no apparent skin lesions or masses noted          Head:  normocephalic, no masses or lesions        Eyes:  pupils equal and round, conjunctivae and lids unremarkable, sclera white, no xanthalasma, EOMS intact, no nystagmus        Lymph:No Cervical lymphadenopathy present     ENT:  no pallor or cyanosis, dentition good        Neck:  carotid pulses are full and equal bilaterally, JVP normal, no carotid bruit        Respiratory:  normal breath sounds, clear to auscultation, normal A-P diameter, normal symmetry, normal respiratory excursion, no use of accessory muscles         Cardiac: regular rhythm,  normal S1/S2, no S3 or S4, apical impulse not displaced, no murmurs, gallops or rubs                pulses full and equal, no bruits auscultated                                        GI:  abdomen soft, non-tender, BS normoactive, no mass, no HSM, no bruits        Extremities and Muscular Skeletal:  no deformities, clubbing, cyanosis, erythema observed              Neurological:  no gross motor deficits        Psych:  Alert and Oriented x 3        CC  Daquan Andino MD  [address]  [place] [address]                Thank you for allowing me to participate in the care of your patient.      Sincerely,     Jean Pate MD     [facility]    cc:   Daquan Andino MD  [address]  [place] [address]

## 2019-09-18 NOTE — PROGRESS NOTES
Service Date: 09/18/2019      REASON FOR CONSULTATION:  Coronary artery calcification.      HISTORY OF PRESENT ILLNESS:  I had the pleasure of seeing Mr. Lomeli in consultation at the [facility] today.  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  Unfortunately, during his move, he experienced a CVA that was manifested clinically by left-sided weakness and numbness.  He was actually hospitalized in [place], [place], and underwent a comprehensive workup that included a CTA which was essentially normal but an abnormal head MRI which demonstrated diffusion restriction involving the right frontal lobe.  He was outside the tPA window and was started on Plavix due to history of anaphylaxis with aspirin.  He had already been on lipid-lowering with simvastatin, which was continued.  His neurological deficits fortunately resolved quickly, and followup in [place] was recommended.        From a cardiac standpoint, he underwent an echocardiogram at the time of his admission which demonstrated normal left and right ventricular systolic function with no evidence of intra-atrial shunting.  No significant valvular disease was identified.  Coronary artery calcification was noted on the CT of the head and neck, and given these findings cardiac consultation was requested.      The patient also underwent a recent Zio Patch monitor which demonstrated no evidence of atrial fibrillation.      Today he feels well overall from a cardiovascular standpoint.  He specifically denies any chest pain, dyspnea on exertion, PND, orthopnea or lower extremity edema.  Denies any syncope or presyncope.  He has been taking all his medications as prescribed.  His main form of activity is walking, and he walked 3 miles recently without any limitations.      Past medical history, family history, social history, allergies and medications are in my Epic note.       PHYSICAL EXAMINATION:  Dictated below.      Lipids on 2019 demonstrated total cholesterol 182, HDL 53, LDL 73, triglycerides of 281.      IMPRESSION:   1.  CVA on 2019.  The patient's neurological deficits have largely resolved.  The etiology is still unexplained with a normal recent echocardiogram and a Zio Patch that demonstrated no evidence of atrial fibrillation.   2.  Coronary artery calcification commented on a CT of the head and neck during his hospitalization for his CVA.   3.  Dyslipidemia.   4.  Hypertension.       Mr. Lomeli presents for an evaluation regarding moderate coronary artery calcification described on a CT of the head and neck in  of this year during a hospitalization for an acute CVA.  I had a detailed discussion with the patient about the implications and about the methodology and implications of coronary artery calcium scoring.  I did explain to him, the first step would be to precisely quantify his atherosclerotic burden with a dedicated coronary artery calcium scoring study.  He is agreeable with having this performed.  Once this data is available, we can decide if further evaluation such as stress perfusion imaging is indicated.      From a risk factor modification standpoint, he has lost about 40 pounds since July, which I congratulated him upon.  I do think more intensive statin therapy is indicated, and I have discontinued his simvastatin and switched him to rosuvastatin 20 mg daily.  A followup lipid panel will be obtained in approximately 4-6 weeks.        It was a pleasure seeing him in consultation today.  Further recommendations will be based on the results of the coronary artery calcium scoring.         BRIDGET HU MD             D: 2019   T: 2019   MT: EDGARDO      Name:     KAIA LOMELI   MRN:      [id number]        Account:      [id number]   :      1960           Service Date: 2019      Document: [id number]

## 2019-09-18 NOTE — LETTER
9/18/2019      Daquan Andino MD  [address] [facility] [address]  [facility] [address]      RE: Lenny Lomeli       Dear Colleague,    I had the pleasure of seeing Lenny Lomeli in the [facility].    Service Date: 09/18/2019      REASON FOR CONSULTATION:  Coronary artery calcification.      HISTORY OF PRESENT ILLNESS:  I had the pleasure of seeing Mr. Lomeli in consultation at the [facility] today.  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  Unfortunately, during his move, he experienced a CVA that was manifested clinically by left-sided weakness and numbness.  He was actually hospitalized in [place], [place], and underwent a comprehensive workup that included a CTA which was essentially normal but an abnormal head MRI which demonstrated diffusion restriction involving the right frontal lobe.  He was outside the tPA window and was started on Plavix due to history of anaphylaxis with aspirin.  He had already been on lipid-lowering with simvastatin, which was continued.  His neurological deficits fortunately resolved quickly, and followup in [place] was recommended.        From a cardiac standpoint, he underwent an echocardiogram at the time of his admission which demonstrated normal left and right ventricular systolic function with no evidence of intra-atrial shunting.  No significant valvular disease was identified.  Coronary artery calcification was noted on the CT of the head and neck, and given these findings cardiac consultation was requested.      The patient also underwent a recent Zio Patch monitor which demonstrated no evidence of atrial fibrillation.      Today he feels well overall from a cardiovascular standpoint.  He specifically denies any chest pain, dyspnea on exertion, PND, orthopnea or lower extremity edema.  Denies any syncope or presyncope.  He has been taking all his  medications as prescribed.  His main form of activity is walking, and he walked 3 miles recently without any limitations.      Past medical history, family history, social history, allergies and medications are in my Epic note.      PHYSICAL EXAMINATION:  Dictated below.      Lipids on 08/06/2019 demonstrated total cholesterol 182, HDL 53, LDL 73, triglycerides of 281.      IMPRESSION:   1.  CVA on 07/24/2019.  The patient's neurological deficits have largely resolved.  The etiology is still unexplained with a normal recent echocardiogram and a Zio Patch that demonstrated no evidence of atrial fibrillation.   2.  Coronary artery calcification commented on a CT of the head and neck during his hospitalization for his CVA.   3.  Dyslipidemia.   4.  Hypertension.       Mr. Lomeli presents for an evaluation regarding moderate coronary artery calcification described on a CT of the head and neck in June of this year during a hospitalization for an acute CVA.  I had a detailed discussion with the patient about the implications and about the methodology and implications of coronary artery calcium scoring.  I did explain to him, the first step would be to precisely quantify his atherosclerotic burden with a dedicated coronary artery calcium scoring study.  He is agreeable with having this performed.  Once this data is available, we can decide if further evaluation such as stress perfusion imaging is indicated.      From a risk factor modification standpoint, he has lost about 40 pounds since July, which I congratulated him upon.  I do think more intensive statin therapy is indicated, and I have discontinued his simvastatin and switched him to rosuvastatin 20 mg daily.  A followup lipid panel will be obtained in approximately 4-6 weeks.        It was a pleasure seeing him in consultation today.  Further recommendations will be based on the results of the coronary artery calcium scoring.         BRIDGET HU MD              D: 2019   T: 2019   MT: EDGARDO      Name:     KAIA SALAZAR   MRN:      [id number]        Account:      [id number]   :      1960           Service Date: 2019      Document: [id number]           Outpatient Encounter Medications as of 2019   Medication Sig Dispense Refill     clopidogrel (PLAVIX) 75 MG tablet Take 1 tablet (75 mg) by mouth daily 30 tablet 0     co-enzyme Q-10 100 MG CAPS capsule Take by mouth daily       losartan-hydrochlorothiazide (HYZAAR) 50-12.5 MG tablet Take 1 tablet by mouth daily 90 tablet 0     rosuvastatin (CRESTOR) 20 MG tablet Take 1 tablet (20 mg) by mouth daily 90 tablet 3     [DISCONTINUED] simvastatin (ZOCOR) 40 MG tablet Take 1 tablet (40 mg) by mouth At Bedtime 90 tablet 1     No facility-administered encounter medications on file as of 2019.        Again, thank you for allowing me to participate in the care of your patient.      Sincerely,    Jean Pate MD     [facility]

## 2019-09-25 DIAGNOSIS — I63.9 CEREBROVASCULAR ACCIDENT (CVA), UNSPECIFIED MECHANISM (H): ICD-10-CM

## 2019-09-25 RX ORDER — CLOPIDOGREL BISULFATE 75 MG/1
75 TABLET ORAL DAILY
Qty: 90 TABLET | Refills: 1 | Status: SHIPPED | OUTPATIENT
Start: 2019-09-25 | End: 2020-02-19

## 2019-09-25 NOTE — TELEPHONE ENCOUNTER
Reason for Call:  Medication or medication refill:    Do you use a [facility]?  Name of the pharmacy and phone number for the current request:  [facility], [place] - [address] [facility]    Name of the medication requested:   clopidogrel (PLAVIX) 75 MG tablet 30 tablet         Other request:  Pt needs refill for this rx. Pt wants to know why he has not refills left ? Please advise     Can we leave a detailed message on this number? YES    Phone number patient can be reached at: Home number on file [phone] (home)    Best Time: any    Call taken on 9/25/2019 at 9:51 AM by Lenny Reilly

## 2019-09-25 NOTE — TELEPHONE ENCOUNTER
"Requested Prescriptions   Pending Prescriptions Disp Refills     clopidogrel (PLAVIX) 75 MG tablet 90 tablet 1     Sig: Take 1 tablet (75 mg) by mouth daily       Plavix Failed - 9/25/2019 10:11 AM        Failed - Normal HGB on file in past 12 months     No lab results found.            Failed - Normal Platelets on file in past 12 months     No lab results found.            Passed - No active PPI on record unless is Protonix        Passed - Recent (12 mo) or future (30 days) visit within the authorizing provider's specialty     Patient had office visit in the last 12 months or has a visit in the next 30 days with authorizing provider or within the authorizing provider's specialty.  See \"Patient Info\" tab in inbasket, or \"Choose Columns\" in Meds & Orders section of the refill encounter.              Passed - Medication is active on med list        Passed - Patient is age 18 or older        Refilled for 90 days with 1 refill and ordered CBC plan is to get lipids in another 3 weeks, per cardiology notes and can draw CBC at that time.   "

## 2019-10-04 ENCOUNTER — OFFICE VISIT (OUTPATIENT)
Dept: FAMILY MEDICINE | Facility: CLINIC | Age: 59
End: 2019-10-04
Payer: COMMERCIAL

## 2019-10-04 VITALS
HEIGHT: 70 IN | SYSTOLIC BLOOD PRESSURE: 109 MMHG | HEART RATE: 66 BPM | OXYGEN SATURATION: 99 % | DIASTOLIC BLOOD PRESSURE: 75 MMHG | WEIGHT: 190 LBS | BODY MASS INDEX: 27.2 KG/M2

## 2019-10-04 DIAGNOSIS — E66.811 CLASS 1 OBESITY WITH SERIOUS COMORBIDITY AND BODY MASS INDEX (BMI) OF 32.0 TO 32.9 IN ADULT, UNSPECIFIED OBESITY TYPE: ICD-10-CM

## 2019-10-04 DIAGNOSIS — R73.03 BORDERLINE DIABETES: ICD-10-CM

## 2019-10-04 DIAGNOSIS — E04.1 THYROID NODULE: ICD-10-CM

## 2019-10-04 DIAGNOSIS — I25.10 CORONARY ARTERY CALCIFICATION OF NATIVE ARTERY: ICD-10-CM

## 2019-10-04 DIAGNOSIS — I10 ESSENTIAL HYPERTENSION: ICD-10-CM

## 2019-10-04 DIAGNOSIS — I25.84 CORONARY ARTERY CALCIFICATION OF NATIVE ARTERY: ICD-10-CM

## 2019-10-04 DIAGNOSIS — E78.5 HYPERLIPIDEMIA LDL GOAL <70: ICD-10-CM

## 2019-10-04 DIAGNOSIS — I63.9 CEREBROVASCULAR ACCIDENT (CVA), UNSPECIFIED MECHANISM (H): Primary | ICD-10-CM

## 2019-10-04 PROCEDURE — 99214 OFFICE O/P EST MOD 30 MIN: CPT | Performed by: INTERNAL MEDICINE

## 2019-10-04 RX ORDER — LOSARTAN POTASSIUM AND HYDROCHLOROTHIAZIDE 12.5; 5 MG/1; MG/1
TABLET ORAL
Qty: 90 TABLET | Refills: 0 | COMMUNITY
Start: 2019-10-04

## 2019-10-04 ASSESSMENT — MIFFLIN-ST. JEOR: SCORE: 1678.08

## 2019-10-04 NOTE — PROGRESS NOTES
Subjective     Lenny Lomeli is a 59 year old male who presents to clinic today for the following health issues:    HPI   Cerebrovascular Follow-up      Patient history: CVA    Residual symptoms: None    Worsened or new symptoms since last visit: No    Daily aspirin use: NO , on Plavix    Hypertension controlled: Yes            Patient Active Problem List   Diagnosis     Thyroid nodule     Class 1 obesity with serious comorbidity and body mass index (BMI) of 32.0 to 32.9 in adult, unspecified obesity type     Borderline diabetes     Coronary artery calcification of native artery     Snoring     Essential hypertension     Memory difficulties     Cerebrovascular accident (CVA), unspecified mechanism (H)     DDD (degenerative disc disease), cervical     Cervical stenosis of spinal canal     Hyperlipidemia LDL goal <70     Cervical spondylosis without myelopathy     Past Surgical History:   Procedure Laterality Date     HERNIA REPAIR, UMBILICAL         Social History     Tobacco Use     Smoking status: Never Smoker     Smokeless tobacco: Never Used   Substance Use Topics     Alcohol use: Not Currently     Family History   Problem Relation Age of Onset     Coronary Artery Disease Father      Myocardial Infarction Brother      Hypertension Brother      Diabetes Type 2  Sister          Current Outpatient Medications   Medication Sig Dispense Refill     clopidogrel (PLAVIX) 75 MG tablet Take 1 tablet (75 mg) by mouth daily 90 tablet 1     co-enzyme Q-10 100 MG CAPS capsule Take by mouth daily       losartan-hydrochlorothiazide (HYZAAR) 50-12.5 MG tablet 0.5 TABLET DAILY 90 tablet 0     rosuvastatin (CRESTOR) 20 MG tablet Take 1 tablet (20 mg) by mouth daily 90 tablet 3     Allergies   Allergen Reactions     Aspirin      Recent Labs   Lab Test 09/06/19  1124 08/06/19  1604 07/25/19 07/24/19   A1C  --  5.4  --   --    LDL  --  73 42  --    HDL  --  53 54  --    TRIG  --  281* 149  --    ALT  --  43 37 36   CR 0.96 1.05 0.90  "1.00   GFRESTIMATED 85 77  --  >60   GFRESTBLACK >90 89  --   --    POTASSIUM 3.6 3.9  --  3.6   TSH  --  0.86  --   --       BP Readings from Last 3 Encounters:   10/04/19 109/75   09/18/19 120/77   09/06/19 109/71    Wt Readings from Last 3 Encounters:   10/04/19 86.2 kg (190 lb)   09/18/19 87.1 kg (192 lb)   09/06/19 89.4 kg (197 lb)                    Reviewed and updated as needed this visit by Provider  Tobacco  Allergies  Meds  Med Hx  Surg Hx  Fam Hx  Soc Hx        Review of Systems   ROS COMP: Constitutional, HEENT, cardiovascular, pulmonary, gi and gu systems are negative, except as otherwise noted.      Objective    /75   Pulse 66   Ht 1.778 m (5' 10\")   Wt 86.2 kg (190 lb)   SpO2 99%   BMI 27.26 kg/m    Body mass index is 27.26 kg/m .  Physical Exam   GENERAL: healthy, alert and no distress  EYES: Eyes grossly normal to inspection, PERRL and conjunctivae and sclerae normal  HENT: ear canals and TM's normal, nose and mouth without ulcers or lesions  NECK: no adenopathy, no asymmetry, masses, or scars and thyroid normal to palpation  RESP: lungs clear to auscultation - no rales, rhonchi or wheezes  CV: regular rate and rhythm, normal S1 S2, no S3 or S4, no murmur, click or rub, no peripheral edema and peripheral pulses strong  ABDOMEN: soft, nontender, no hepatosplenomegaly, no masses and bowel sounds normal  MS: no gross musculoskeletal defects noted, no edema  SKIN: no suspicious lesions or rashes  NEURO: Normal strength and tone, mentation intact and speech normal, negative cerebellar signs.  PSYCH: mentation appears normal, affect normal/bright    Diagnostic Test Results:  Labs reviewed in Epic        Assessment & Plan   Problem List Items Addressed This Visit        Nervous and Auditory    Cerebrovascular accident (CVA), unspecified mechanism (H) - Primary       Digestive    Class 1 obesity with serious comorbidity and body mass index (BMI) of 32.0 to 32.9 in adult, unspecified " "obesity type       Endocrine    Thyroid nodule    Hyperlipidemia LDL goal <70       Circulatory    Coronary artery calcification of native artery    Essential hypertension    Relevant Medications    losartan-hydrochlorothiazide (HYZAAR) 50-12.5 MG tablet       Other    Borderline diabetes         Will be undergoing further cardiac risk stratification as per cardiology with dedicated coronary artery calcium score.  Discussed BP management . He remains on half tablet of losartan/HCTZ    BMI:   Estimated body mass index is 27.26 kg/m  as calculated from the following:    Height as of this encounter: 1.778 m (5' 10\").    Weight as of this encounter: 86.2 kg (190 lb).   Weight management plan: Discussed healthy diet and exercise guidelines        Work on weight loss  Regular exercise  See Patient Instructions  Return in about 3 months (around 1/4/2020), or if symptoms worsen or fail to improve, for PCP.    Daquan Andino MD  [facility]    "

## 2019-10-09 ENCOUNTER — HOSPITAL ENCOUNTER (OUTPATIENT)
Dept: CARDIOLOGY | Facility: CLINIC | Age: 59
Discharge: HOME | End: 2019-10-09
Attending: INTERNAL MEDICINE | Admitting: INTERNAL MEDICINE
Payer: COMMERCIAL

## 2019-10-09 ENCOUNTER — DOCUMENTATION ONLY (OUTPATIENT)
Dept: CARDIOLOGY | Facility: CLINIC | Age: 59
End: 2019-10-09

## 2019-10-09 DIAGNOSIS — I25.84 CORONARY ARTERY CALCIFICATION OF NATIVE ARTERY: ICD-10-CM

## 2019-10-09 DIAGNOSIS — R93.1 ELEVATED CORONARY ARTERY CALCIUM SCORE: ICD-10-CM

## 2019-10-09 DIAGNOSIS — I25.10 CORONARY ARTERY CALCIFICATION OF NATIVE ARTERY: ICD-10-CM

## 2019-10-09 PROCEDURE — 75571 CT HRT W/O DYE W/CA TEST: CPT | Mod: 26 | Performed by: INTERNAL MEDICINE

## 2019-10-09 PROCEDURE — 75571 CT HRT W/O DYE W/CA TEST: CPT

## 2019-10-09 NOTE — PROGRESS NOTES
CT calcium score 10/9/19 noted. Ordered for follow up of calcified coronary arteries viewed on CT head and neck post-CVA. Per dictation - to consider further stress testing.    CT calcium:   Left main coronary artery: 7.18  Left anterior descending coronary artery: 346.5  Circumflex coronary artery: 317.52  Right coronary artery: 59.47  TOTAL CALCIUM SCORE: 730.67  Soft tissue = no incidental findings    Will message Dr. Pate to review    10/11/19 message from Dr. Pate:  Given the severe coronary calcification, he will need an exercise stress  Perfusion study    Order placed for nuclear study.  Called patient to review CT calcium results and Dr. Pate's recommendation for nuclear study. Patient would like to schedule this asap. He notes he is able to exercise and walk 2-3 miles without symptoms. Connected patient with scheduling

## 2019-10-14 ENCOUNTER — HOSPITAL ENCOUNTER (OUTPATIENT)
Dept: CARDIOLOGY | Facility: CLINIC | Age: 59
Discharge: HOME | End: 2019-10-14
Attending: INTERNAL MEDICINE | Admitting: INTERNAL MEDICINE
Payer: COMMERCIAL

## 2019-10-14 DIAGNOSIS — R93.1 ELEVATED CORONARY ARTERY CALCIUM SCORE: ICD-10-CM

## 2019-10-14 PROCEDURE — 78452 HT MUSCLE IMAGE SPECT MULT: CPT

## 2019-10-14 PROCEDURE — 93018 CV STRESS TEST I&R ONLY: CPT | Performed by: INTERNAL MEDICINE

## 2019-10-14 PROCEDURE — 78452 HT MUSCLE IMAGE SPECT MULT: CPT | Mod: 26 | Performed by: INTERNAL MEDICINE

## 2019-10-14 PROCEDURE — A9502 TC99M TETROFOSMIN: HCPCS | Performed by: INTERNAL MEDICINE

## 2019-10-14 PROCEDURE — 34300033 ZZH RX 343: Performed by: INTERNAL MEDICINE

## 2019-10-14 PROCEDURE — 93016 CV STRESS TEST SUPVJ ONLY: CPT | Performed by: INTERNAL MEDICINE

## 2019-10-14 RX ADMIN — TETROFOSMIN 3.3 MCI.: 1.38 INJECTION, POWDER, LYOPHILIZED, FOR SOLUTION INTRAVENOUS at 09:27

## 2019-10-14 RX ADMIN — TETROFOSMIN 9.68 MCI.: 1.38 INJECTION, POWDER, LYOPHILIZED, FOR SOLUTION INTRAVENOUS at 11:01

## 2019-10-15 ENCOUNTER — TELEPHONE (OUTPATIENT)
Dept: CARDIOLOGY | Facility: CLINIC | Age: 59
End: 2019-10-15

## 2019-10-15 DIAGNOSIS — I25.84 CORONARY ARTERY CALCIFICATION OF NATIVE ARTERY: Primary | ICD-10-CM

## 2019-10-15 DIAGNOSIS — I25.10 CORONARY ARTERY CALCIFICATION OF NATIVE ARTERY: Primary | ICD-10-CM

## 2019-10-15 NOTE — TELEPHONE ENCOUNTER
Nuclear stress test results 10-14-19-   1.  Myocardial perfusion imaging using single isotope technique demonstrated normal myocardial perfusion. There is no ischemia or infarction identified on this study..   2. Gated images demonstrated normal wall motion and normal left ventricular chamber size.  The left ventricular systolic function  normal, with an ejection fraction of 53%.    Lipids/ALT  scheduled 10-25-19    Last Dr. Pate OV 9-18-19 - first step would be to precisely quantify his atherosclerotic burden with a dedicated coronary artery calcium scoring study.  He is agreeable with having this performed.  Once this data is available, we can decide if further evaluation such as stress perfusion imaging is indicated.   Ca+ score was done and reviewed 10-9-19   Left main coronary artery: 7.18  Left anterior descending coronary artery: 346.5  Circumflex coronary artery: 317.52  Right coronary artery: 59.47   TOTAL CALCIUM SCORE: 730.67  The total Agatston calcium score is 730.67 placing the patient in the 95th percentile when compared to age and gender matched control group.    Dr. Pate to review.

## 2019-10-15 NOTE — TELEPHONE ENCOUNTER
Attempted to contact patient to review nuclear study as showing no ischemia. Per Dr. Pate - see in 1 year for follow up.  Left message for patient to call back.    1320 spoke with patient, he was very pleased with stress test results. Will be rechecking lipids on 10/25/19 after starting crestor 20mg daily. Patient notes he has lost 55 pounds since his stroke.

## 2019-10-25 ENCOUNTER — TELEPHONE (OUTPATIENT)
Dept: CARDIOLOGY | Facility: CLINIC | Age: 59
End: 2019-10-25

## 2019-10-25 DIAGNOSIS — I25.10 CORONARY ARTERY CALCIFICATION OF NATIVE ARTERY: ICD-10-CM

## 2019-10-25 DIAGNOSIS — I25.84 CORONARY ARTERY CALCIFICATION OF NATIVE ARTERY: ICD-10-CM

## 2019-10-25 LAB
ALT SERPL W P-5'-P-CCNC: <5 U/L (ref 5–30)
CHOLEST SERPL-MCNC: 139 MG/DL
HDLC SERPL-MCNC: 70 MG/DL
LDLC SERPL CALC-MCNC: 57 MG/DL
NONHDLC SERPL-MCNC: 69 MG/DL
TRIGL SERPL-MCNC: 61 MG/DL

## 2019-10-25 PROCEDURE — 84460 ALANINE AMINO (ALT) (SGPT): CPT | Performed by: INTERNAL MEDICINE

## 2019-10-25 PROCEDURE — 80061 LIPID PANEL: CPT | Performed by: INTERNAL MEDICINE

## 2019-10-25 PROCEDURE — 36415 COLL VENOUS BLD VENIPUNCTURE: CPT | Performed by: INTERNAL MEDICINE

## 2019-10-25 NOTE — TELEPHONE ENCOUNTER
Spoke with patient to review results, he states he is feeling well on the crestor and is please with the results. Will message lipid panel to Dr. Pate with update

## 2019-10-25 NOTE — TELEPHONE ENCOUNTER
FLP drawn approximately 1mo after changing from simvastatin 40mg to rosuvastatin 20mg daily     Labs note Chol 139, HDL 70, LDL 57, TG 61.     Called patient to see how he is tolerating the medication, LVM to call back.

## 2019-11-24 ENCOUNTER — MYC MEDICAL ADVICE (OUTPATIENT)
Dept: CARDIOLOGY | Facility: CLINIC | Age: 59
End: 2019-11-24

## 2019-11-25 NOTE — TELEPHONE ENCOUNTER
"[employer] message routed to Dr Pate. Left message for patient notifying him that Dr Pate is off this week, will not likely get reply this week.     \"Hi Dr. Pate,     I was encouraged by the results from my recent treadmill stress test and though the calcium quantification numbers are clearly not great at least my life style changes are in the right direction.  With that in mind I have the following question.     I'm currently maintaining my weight around 170lbs and exercising regularly.   I've been walking long distances for several months and I'd like to consider adding some jogging to my routine.  Do you see any particular reason that I should not do this or suggestions on how to safely add that level of activity to my workouts?     Best regards for a happy holiday week,     Jose Lomeli (Richard)\"   "

## 2019-11-26 NOTE — TELEPHONE ENCOUNTER
"Called patient with message from Dr Pate- \"The best way would be to start intervals (2 minutes walking, 2 minutes jogging) and to build up from there. I have no concerns as long as he has no symptoms while exercising.\" Pt verbalized understanding, agreeable to plan. Pt will call with any exertional symptoms, no further questions at this time.   "

## 2019-11-26 NOTE — TELEPHONE ENCOUNTER
The best way would be to start intervals (2 minutes walking, 2 minutes jogging) and to build up from there. I have no concerns as long as he has no symptoms while exercising. Thanks.

## 2019-12-04 ENCOUNTER — MYC MEDICAL ADVICE (OUTPATIENT)
Dept: CARDIOLOGY | Facility: CLINIC | Age: 59
End: 2019-12-04

## 2019-12-04 NOTE — TELEPHONE ENCOUNTER
"[employer] message routed to Dr Pate:     \"A while back I think I mentioned that I was having some anxiety issues.  My anxiety issues are long standing and predate the recent stroke and I m considering asking my primary care provider about trying a low dose of some kind of mood stabilizer or anxiety medication for persistent intermittent episodes of anxiety.       I m writing to ask if, in light of the cardiology  profile , there are particular medications that you think I should avoid.     Added information is that I started seeing a psychologist about six weeks ago.     Thanks,   Lenny\"   "

## 2019-12-05 NOTE — TELEPHONE ENCOUNTER
No specific warnings at this time. He can call us with his primary care's recommendation and we can review to make sure that there are no specific issues related to that medication. Thanks.

## 2020-02-17 DIAGNOSIS — I63.9 CEREBROVASCULAR ACCIDENT (CVA), UNSPECIFIED MECHANISM (H): ICD-10-CM

## 2020-02-18 NOTE — TELEPHONE ENCOUNTER
"Requested Prescriptions   Pending Prescriptions Disp Refills     clopidogrel 75 MG PO tablet 90 tablet 1     Sig: Take 1 tablet (75 mg) by mouth daily       Plavix Failed - 2/17/2020 12:06 PM        Failed - Normal HGB on file in past 12 months     No lab results found.            Failed - Normal Platelets on file in past 12 months     No lab results found.            Passed - No active PPI on record unless is Protonix        Passed - Recent (12 mo) or future (30 days) visit within the authorizing provider's specialty     Patient has had an office visit with the authorizing provider or a provider within the authorizing providers department within the previous 12 mos or has a future within next 30 days. See \"Patient Info\" tab in inbasket, or \"Choose Columns\" in Meds & Orders section of the refill encounter.              Passed - Medication is active on med list        Passed - Patient is age 18 or older        Last Written Prescription Date:  09/25/19  Last Fill Quantity: 90,  # refills: 1   Last office visit: 10/4/2019 with prescribing provider:     Future Office Visit:      Selam Zacarias MA      "

## 2020-02-19 ENCOUNTER — TELEPHONE (OUTPATIENT)
Dept: FAMILY MEDICINE | Facility: CLINIC | Age: 60
End: 2020-02-19

## 2020-02-19 RX ORDER — CLOPIDOGREL BISULFATE 75 MG/1
75 TABLET ORAL DAILY
Qty: 30 TABLET | Refills: 0 | Status: SHIPPED | OUTPATIENT
Start: 2020-02-19

## 2020-02-19 NOTE — TELEPHONE ENCOUNTER
To PCP:     Routing refill request to provider for review/approval because:  Labs not current:  CBC    Pt is due for appt- pended #30 with note to schedule.     Please review and authorize if appropriate,     Thank you,   Amie COTA RN

## 2020-02-20 NOTE — TELEPHONE ENCOUNTER
patient will need to schedule a follow up.  Please check with patient , has he scheduled with Neurology as advised? [h/o CVA].   Thank you

## 2020-02-20 NOTE — TELEPHONE ENCOUNTER
Patient Needs follow up  Has patient scheduled with Neurology follow up as advised? [h/o CVA].   Thank you

## 2020-02-20 NOTE — TELEPHONE ENCOUNTER
I reached him and he has a new PCP at [facility]- [place], Amul. He will call his pharmacy to let them know to not send to us.    He is under the care of [facility].     Ria Cabral RN- Triage [employer]

## 2020-03-11 ENCOUNTER — HEALTH MAINTENANCE LETTER (OUTPATIENT)
Age: 60
End: 2020-03-11

## 2020-10-23 DIAGNOSIS — I25.84 CORONARY ARTERY CALCIFICATION OF NATIVE ARTERY: Primary | ICD-10-CM

## 2020-10-23 DIAGNOSIS — E78.5 HYPERLIPIDEMIA LDL GOAL <70: ICD-10-CM

## 2020-10-23 DIAGNOSIS — R93.1 ELEVATED CORONARY ARTERY CALCIUM SCORE: ICD-10-CM

## 2020-10-23 DIAGNOSIS — I25.10 CORONARY ARTERY CALCIFICATION OF NATIVE ARTERY: Primary | ICD-10-CM

## 2021-01-03 ENCOUNTER — HEALTH MAINTENANCE LETTER (OUTPATIENT)
Age: 61
End: 2021-01-03

## 2021-04-25 ENCOUNTER — HEALTH MAINTENANCE LETTER (OUTPATIENT)
Age: 61
End: 2021-04-25

## 2021-10-10 ENCOUNTER — HEALTH MAINTENANCE LETTER (OUTPATIENT)
Age: 61
End: 2021-10-10

## 2022-05-21 ENCOUNTER — HEALTH MAINTENANCE LETTER (OUTPATIENT)
Age: 62
End: 2022-05-21

## 2022-09-18 ENCOUNTER — HEALTH MAINTENANCE LETTER (OUTPATIENT)
Age: 62
End: 2022-09-18

## 2023-06-04 ENCOUNTER — HEALTH MAINTENANCE LETTER (OUTPATIENT)
Age: 63
End: 2023-06-04

## 2024-10-04 NOTE — PROGRESS NOTES
Clinic Care Coordination Contact  [facility]/Voicemail    Referral Source: PCP    [place] Utilization:7/24-7/31/2019-[facility]  The [facility] [place] 7/31-8/2/2019-Right sided MCA stroke July 24  Mild memory impairment     Clinical Data: Care Coordinator Outreach    Outreach attempted x 1.  Left message on voicemail with call back information and requested return call.    Plan: . Care Coordinator will await a return call     Katrina Garza RN, Care Coordinator   [facility] -Care Coordination  [facility] , [facility]   [phone]       
No return call from patient .  Dis enrollment letter mailed today       Katrina Garza RN, Care Coordinator   [facility] -Care Coordination  [facility] and [facility]   [phone]   
Male